# Patient Record
Sex: FEMALE | Race: WHITE | Employment: OTHER | URBAN - METROPOLITAN AREA
[De-identification: names, ages, dates, MRNs, and addresses within clinical notes are randomized per-mention and may not be internally consistent; named-entity substitution may affect disease eponyms.]

---

## 2017-02-22 ENCOUNTER — LAB REQUISITION (OUTPATIENT)
Dept: LAB | Facility: HOSPITAL | Age: 82
End: 2017-02-22
Attending: FAMILY MEDICINE
Payer: MEDICARE

## 2017-02-22 DIAGNOSIS — E78.5 HYPERLIPIDEMIA: ICD-10-CM

## 2017-02-22 DIAGNOSIS — I10 ESSENTIAL (PRIMARY) HYPERTENSION: ICD-10-CM

## 2017-02-22 LAB
ALBUMIN SERPL-MCNC: 3.6 G/DL (ref 3.5–4.8)
ALP LIVER SERPL-CCNC: 167 U/L (ref 55–142)
ALT SERPL-CCNC: 47 U/L (ref 14–54)
AST SERPL-CCNC: 31 U/L (ref 15–41)
BILIRUB SERPL-MCNC: 0.8 MG/DL (ref 0.1–2)
BUN BLD-MCNC: 21 MG/DL (ref 8–20)
CALCIUM BLD-MCNC: 9.7 MG/DL (ref 8.3–10.3)
CHLORIDE: 103 MMOL/L (ref 101–111)
CHOLEST SMN-MCNC: 245 MG/DL (ref ?–200)
CO2: 26 MMOL/L (ref 22–32)
CREAT BLD-MCNC: 1.01 MG/DL (ref 0.55–1.02)
GLUCOSE BLD-MCNC: 100 MG/DL (ref 70–99)
HDLC SERPL-MCNC: 60 MG/DL (ref 45–?)
HDLC SERPL: 4.08 {RATIO} (ref ?–4.44)
LDLC SERPL CALC-MCNC: 144 MG/DL (ref ?–130)
M PROTEIN MFR SERPL ELPH: 8.1 G/DL (ref 6.1–8.3)
NONHDLC SERPL-MCNC: 185 MG/DL (ref ?–130)
POTASSIUM SERPL-SCNC: 4.1 MMOL/L (ref 3.6–5.1)
SODIUM SERPL-SCNC: 137 MMOL/L (ref 136–144)
TRIGLYCERIDES: 207 MG/DL (ref ?–150)
VLDL: 41 MG/DL (ref 5–40)

## 2017-02-22 PROCEDURE — 80061 LIPID PANEL: CPT | Performed by: FAMILY MEDICINE

## 2017-02-22 PROCEDURE — 36415 COLL VENOUS BLD VENIPUNCTURE: CPT | Performed by: FAMILY MEDICINE

## 2017-02-22 PROCEDURE — 80053 COMPREHEN METABOLIC PANEL: CPT | Performed by: FAMILY MEDICINE

## 2017-12-06 ENCOUNTER — NURSE ONLY (OUTPATIENT)
Dept: LAB | Age: 82
End: 2017-12-06
Attending: FAMILY MEDICINE
Payer: MEDICARE

## 2017-12-06 DIAGNOSIS — E78.5 ELEVATED LIPIDS: ICD-10-CM

## 2017-12-06 DIAGNOSIS — I10 HTN (HYPERTENSION): Primary | ICD-10-CM

## 2017-12-06 DIAGNOSIS — F03.90 DEMENTIA (HCC): ICD-10-CM

## 2017-12-06 PROCEDURE — 80053 COMPREHEN METABOLIC PANEL: CPT

## 2017-12-06 PROCEDURE — 36415 COLL VENOUS BLD VENIPUNCTURE: CPT

## 2017-12-06 PROCEDURE — 80061 LIPID PANEL: CPT

## 2017-12-06 PROCEDURE — 85025 COMPLETE CBC W/AUTO DIFF WBC: CPT

## 2017-12-31 ENCOUNTER — APPOINTMENT (OUTPATIENT)
Dept: GENERAL RADIOLOGY | Facility: HOSPITAL | Age: 82
DRG: 291 | End: 2017-12-31
Attending: EMERGENCY MEDICINE
Payer: MEDICARE

## 2017-12-31 ENCOUNTER — HOSPITAL ENCOUNTER (INPATIENT)
Facility: HOSPITAL | Age: 82
LOS: 2 days | Discharge: HOME | DRG: 291 | End: 2018-01-02
Attending: EMERGENCY MEDICINE | Admitting: INTERNAL MEDICINE
Payer: MEDICARE

## 2017-12-31 ENCOUNTER — APPOINTMENT (OUTPATIENT)
Dept: ULTRASOUND IMAGING | Facility: HOSPITAL | Age: 82
DRG: 291 | End: 2017-12-31
Attending: EMERGENCY MEDICINE
Payer: MEDICARE

## 2017-12-31 DIAGNOSIS — I48.92 ATRIAL FIBRILLATION AND FLUTTER (HCC): ICD-10-CM

## 2017-12-31 DIAGNOSIS — I48.91 ATRIAL FIBRILLATION AND FLUTTER (HCC): ICD-10-CM

## 2017-12-31 DIAGNOSIS — R06.00 DYSPNEA ON EXERTION: Primary | ICD-10-CM

## 2017-12-31 DIAGNOSIS — I10 HYPERTENSION, UNSPECIFIED TYPE: ICD-10-CM

## 2017-12-31 DIAGNOSIS — I50.43 ACUTE ON CHRONIC COMBINED SYSTOLIC AND DIASTOLIC CHF (CONGESTIVE HEART FAILURE) (HCC): ICD-10-CM

## 2017-12-31 LAB
ALBUMIN SERPL-MCNC: 3.7 G/DL (ref 3.5–4.8)
ALP LIVER SERPL-CCNC: 135 U/L (ref 55–142)
ALT SERPL-CCNC: 49 U/L (ref 14–54)
APTT PPP: 32.7 SECONDS (ref 25–34)
AST SERPL-CCNC: 40 U/L (ref 15–41)
BASOPHILS # BLD AUTO: 0.03 X10(3) UL (ref 0–0.1)
BASOPHILS NFR BLD AUTO: 0.4 %
BILIRUB SERPL-MCNC: 0.7 MG/DL (ref 0.1–2)
BILIRUB UR QL STRIP.AUTO: NEGATIVE
BUN BLD-MCNC: 36 MG/DL (ref 8–20)
CALCIUM BLD-MCNC: 9.3 MG/DL (ref 8.3–10.3)
CHLORIDE: 104 MMOL/L (ref 101–111)
CLARITY UR REFRACT.AUTO: CLEAR
CO2: 31 MMOL/L (ref 22–32)
COLOR UR AUTO: YELLOW
CREAT BLD-MCNC: 1.28 MG/DL (ref 0.55–1.02)
D-DIMER: 0.47 UG/ML FEU (ref 0–0.49)
EOSINOPHIL # BLD AUTO: 0.12 X10(3) UL (ref 0–0.3)
EOSINOPHIL NFR BLD AUTO: 1.7 %
ERYTHROCYTE [DISTWIDTH] IN BLOOD BY AUTOMATED COUNT: 15 % (ref 11.5–16)
GLUCOSE BLD-MCNC: 120 MG/DL (ref 70–99)
GLUCOSE UR STRIP.AUTO-MCNC: NEGATIVE MG/DL
HCT VFR BLD AUTO: 39.1 % (ref 34–50)
HGB BLD-MCNC: 13 G/DL (ref 12–16)
IMMATURE GRANULOCYTE COUNT: 0.04 X10(3) UL (ref 0–1)
IMMATURE GRANULOCYTE RATIO %: 0.6 %
INR BLD: 1.11 (ref 0.89–1.11)
KETONES UR STRIP.AUTO-MCNC: NEGATIVE MG/DL
LEUKOCYTE ESTERASE UR QL STRIP.AUTO: NEGATIVE
LYMPHOCYTES # BLD AUTO: 1.41 X10(3) UL (ref 0.9–4)
LYMPHOCYTES NFR BLD AUTO: 20.3 %
M PROTEIN MFR SERPL ELPH: 7.9 G/DL (ref 6.1–8.3)
MCH RBC QN AUTO: 30.3 PG (ref 27–33.2)
MCHC RBC AUTO-ENTMCNC: 33.2 G/DL (ref 31–37)
MCV RBC AUTO: 91.1 FL (ref 81–100)
MONOCYTES # BLD AUTO: 0.78 X10(3) UL (ref 0.1–0.6)
MONOCYTES NFR BLD AUTO: 11.3 %
NEUTROPHIL ABS PRELIM: 4.55 X10 (3) UL (ref 1.3–6.7)
NEUTROPHILS # BLD AUTO: 4.55 X10(3) UL (ref 1.3–6.7)
NEUTROPHILS NFR BLD AUTO: 65.7 %
NITRITE UR QL STRIP.AUTO: NEGATIVE
PH UR STRIP.AUTO: 8 [PH] (ref 4.5–8)
PLATELET # BLD AUTO: 213 10(3)UL (ref 150–450)
POTASSIUM SERPL-SCNC: 4.3 MMOL/L (ref 3.6–5.1)
PRO-BETA NATRIURETIC PEPTIDE: 2615 PG/ML (ref ?–450)
PROT UR STRIP.AUTO-MCNC: NEGATIVE MG/DL
PSA SERPL DL<=0.01 NG/ML-MCNC: 14.4 SECONDS (ref 12–14.3)
RBC # BLD AUTO: 4.29 X10(6)UL (ref 3.8–5.1)
RBC UR QL AUTO: NEGATIVE
RED CELL DISTRIBUTION WIDTH-SD: 49.4 FL (ref 35.1–46.3)
SODIUM SERPL-SCNC: 141 MMOL/L (ref 136–144)
SP GR UR STRIP.AUTO: 1 (ref 1–1.03)
TROPONIN: <0.046 NG/ML (ref ?–0.05)
UROBILINOGEN UR STRIP.AUTO-MCNC: <2 MG/DL
WBC # BLD AUTO: 6.9 X10(3) UL (ref 4–13)

## 2017-12-31 PROCEDURE — 93970 EXTREMITY STUDY: CPT | Performed by: EMERGENCY MEDICINE

## 2017-12-31 PROCEDURE — 71010 XR CHEST AP PORTABLE  (CPT=71010): CPT | Performed by: EMERGENCY MEDICINE

## 2017-12-31 PROCEDURE — 99223 1ST HOSP IP/OBS HIGH 75: CPT | Performed by: INTERNAL MEDICINE

## 2017-12-31 RX ORDER — METOPROLOL SUCCINATE 50 MG/1
50 TABLET, EXTENDED RELEASE ORAL DAILY
Status: DISCONTINUED | OUTPATIENT
Start: 2017-12-31 | End: 2017-12-31

## 2017-12-31 RX ORDER — NITROGLYCERIN 20 MG/100ML
50 INJECTION INTRAVENOUS CONTINUOUS
Status: DISCONTINUED | OUTPATIENT
Start: 2017-12-31 | End: 2017-12-31

## 2017-12-31 RX ORDER — ASPIRIN 81 MG/1
81 TABLET, CHEWABLE ORAL DAILY
Status: DISCONTINUED | OUTPATIENT
Start: 2017-12-31 | End: 2018-01-02

## 2017-12-31 RX ORDER — ACETAMINOPHEN 325 MG/1
650 TABLET ORAL EVERY 6 HOURS PRN
Status: DISCONTINUED | OUTPATIENT
Start: 2017-12-31 | End: 2018-01-02

## 2017-12-31 RX ORDER — DONEPEZIL HYDROCHLORIDE 10 MG/1
10 TABLET, FILM COATED ORAL NIGHTLY
Status: DISCONTINUED | OUTPATIENT
Start: 2017-12-31 | End: 2018-01-02

## 2017-12-31 RX ORDER — MIRTAZAPINE 15 MG/1
30 TABLET, FILM COATED ORAL NIGHTLY
Status: DISCONTINUED | OUTPATIENT
Start: 2017-12-31 | End: 2018-01-02

## 2017-12-31 RX ORDER — FUROSEMIDE 10 MG/ML
40 INJECTION INTRAMUSCULAR; INTRAVENOUS ONCE
Status: COMPLETED | OUTPATIENT
Start: 2017-12-31 | End: 2017-12-31

## 2017-12-31 RX ORDER — ALLOPURINOL 100 MG/1
100 TABLET ORAL DAILY
Status: DISCONTINUED | OUTPATIENT
Start: 2017-12-31 | End: 2018-01-02

## 2017-12-31 RX ORDER — QUETIAPINE 25 MG/1
25 TABLET, FILM COATED ORAL NIGHTLY
Status: DISCONTINUED | OUTPATIENT
Start: 2017-12-31 | End: 2018-01-02

## 2017-12-31 RX ORDER — LABETALOL HYDROCHLORIDE 5 MG/ML
10 INJECTION, SOLUTION INTRAVENOUS EVERY 4 HOURS PRN
Status: DISCONTINUED | OUTPATIENT
Start: 2017-12-31 | End: 2018-01-02

## 2017-12-31 RX ORDER — DULOXETIN HYDROCHLORIDE 30 MG/1
60 CAPSULE, DELAYED RELEASE ORAL DAILY
Status: DISCONTINUED | OUTPATIENT
Start: 2017-12-31 | End: 2018-01-02

## 2017-12-31 RX ORDER — FUROSEMIDE 10 MG/ML
20 INJECTION INTRAMUSCULAR; INTRAVENOUS
Status: DISCONTINUED | OUTPATIENT
Start: 2017-12-31 | End: 2018-01-01

## 2017-12-31 RX ORDER — LOSARTAN POTASSIUM 100 MG/1
100 TABLET ORAL DAILY
Status: DISCONTINUED | OUTPATIENT
Start: 2017-12-31 | End: 2018-01-02

## 2017-12-31 RX ORDER — METOPROLOL SUCCINATE 25 MG/1
25 TABLET, EXTENDED RELEASE ORAL
Status: DISCONTINUED | OUTPATIENT
Start: 2018-01-01 | End: 2018-01-02

## 2017-12-31 RX ORDER — ONDANSETRON 2 MG/ML
4 INJECTION INTRAMUSCULAR; INTRAVENOUS EVERY 6 HOURS PRN
Status: DISCONTINUED | OUTPATIENT
Start: 2017-12-31 | End: 2018-01-02

## 2018-01-01 ENCOUNTER — APPOINTMENT (OUTPATIENT)
Dept: GENERAL RADIOLOGY | Facility: HOSPITAL | Age: 83
DRG: 291 | End: 2018-01-01
Attending: INTERNAL MEDICINE
Payer: MEDICARE

## 2018-01-01 LAB
BASOPHILS # BLD AUTO: 0.04 X10(3) UL (ref 0–0.1)
BASOPHILS NFR BLD AUTO: 0.6 %
BUN BLD-MCNC: 32 MG/DL (ref 8–20)
CALCIUM BLD-MCNC: 9.7 MG/DL (ref 8.3–10.3)
CHLORIDE: 104 MMOL/L (ref 101–111)
CO2: 31 MMOL/L (ref 22–32)
CREAT BLD-MCNC: 1.16 MG/DL (ref 0.55–1.02)
EOSINOPHIL # BLD AUTO: 0.15 X10(3) UL (ref 0–0.3)
EOSINOPHIL NFR BLD AUTO: 2.2 %
ERYTHROCYTE [DISTWIDTH] IN BLOOD BY AUTOMATED COUNT: 14.8 % (ref 11.5–16)
GLUCOSE BLD-MCNC: 103 MG/DL (ref 70–99)
HCT VFR BLD AUTO: 39.9 % (ref 34–50)
HGB BLD-MCNC: 13.1 G/DL (ref 12–16)
IMMATURE GRANULOCYTE COUNT: 0.02 X10(3) UL (ref 0–1)
IMMATURE GRANULOCYTE RATIO %: 0.3 %
LYMPHOCYTES # BLD AUTO: 1.57 X10(3) UL (ref 0.9–4)
LYMPHOCYTES NFR BLD AUTO: 23.5 %
MCH RBC QN AUTO: 30.2 PG (ref 27–33.2)
MCHC RBC AUTO-ENTMCNC: 32.8 G/DL (ref 31–37)
MCV RBC AUTO: 91.9 FL (ref 81–100)
MONOCYTES # BLD AUTO: 0.8 X10(3) UL (ref 0.1–0.6)
MONOCYTES NFR BLD AUTO: 12 %
NEUTROPHIL ABS PRELIM: 4.11 X10 (3) UL (ref 1.3–6.7)
NEUTROPHILS # BLD AUTO: 4.11 X10(3) UL (ref 1.3–6.7)
NEUTROPHILS NFR BLD AUTO: 61.4 %
PLATELET # BLD AUTO: 206 10(3)UL (ref 150–450)
POTASSIUM SERPL-SCNC: 3.8 MMOL/L (ref 3.6–5.1)
RBC # BLD AUTO: 4.34 X10(6)UL (ref 3.8–5.1)
RED CELL DISTRIBUTION WIDTH-SD: 49.1 FL (ref 35.1–46.3)
SODIUM SERPL-SCNC: 143 MMOL/L (ref 136–144)
WBC # BLD AUTO: 6.7 X10(3) UL (ref 4–13)

## 2018-01-01 PROCEDURE — 71046 X-RAY EXAM CHEST 2 VIEWS: CPT | Performed by: INTERNAL MEDICINE

## 2018-01-01 PROCEDURE — 99233 SBSQ HOSP IP/OBS HIGH 50: CPT | Performed by: HOSPITALIST

## 2018-01-01 RX ORDER — SENNOSIDES 8.6 MG
8.6 TABLET ORAL NIGHTLY
COMMUNITY

## 2018-01-01 RX ORDER — POTASSIUM CHLORIDE 20 MEQ/1
40 TABLET, EXTENDED RELEASE ORAL ONCE
Status: COMPLETED | OUTPATIENT
Start: 2018-01-01 | End: 2018-01-01

## 2018-01-01 RX ORDER — HYDRALAZINE HYDROCHLORIDE 25 MG/1
25 TABLET, FILM COATED ORAL 2 TIMES DAILY
Status: DISCONTINUED | OUTPATIENT
Start: 2018-01-01 | End: 2018-01-02

## 2018-01-01 RX ORDER — DOCUSATE SODIUM 100 MG/1
100 CAPSULE, LIQUID FILLED ORAL 2 TIMES DAILY
COMMUNITY

## 2018-01-01 RX ORDER — FUROSEMIDE 40 MG/1
40 TABLET ORAL DAILY
Status: DISCONTINUED | OUTPATIENT
Start: 2018-01-02 | End: 2018-01-02

## 2018-01-01 RX ORDER — FUROSEMIDE 10 MG/ML
20 INJECTION INTRAMUSCULAR; INTRAVENOUS
Status: COMPLETED | OUTPATIENT
Start: 2018-01-01 | End: 2018-01-01

## 2018-01-01 RX ORDER — SENNOSIDES 8.6 MG
8.6 TABLET ORAL 2 TIMES DAILY
Status: DISCONTINUED | OUTPATIENT
Start: 2018-01-01 | End: 2018-01-02

## 2018-01-01 NOTE — PROGRESS NOTES
12/31/17 2109 12/31/17 2112 12/31/17 2115   Vital Signs   Pulse 100 100 114   Heart Rate Source Monitor Monitor Monitor   Resp 20 --  --    Respiratory Quality Normal --  --    BP (!) 164/100 (!) 157/116 160/77   BP Location Right arm Right arm Right

## 2018-01-01 NOTE — PROGRESS NOTES
GREG HOSPITALIST  Progress Note     John Goncalves Patient Status:  Observation    1930 MRN [id number]   [facility] 2NE-A Attending Tio Encarnacion MD   Hosp Day # 0 PCP Heather Arevalo DO     Chief Complaint: dyspnea    S: Patient sta mg Oral Nightly   • Metoprolol Succinate ER  25 mg Oral 2x Daily(Beta Blocker)       ASSESSMENT / PLAN:     1. Acute CHF  1. Currently does not appear significantly fluid OL  2. Wean IV diuresis  3. Await records from OSH  2.  Hypertensive heart disease wit

## 2018-01-01 NOTE — H&P
GREG HOSPITALIST  History and Physical     Jyoti Evette Patient Status:  Emergency    1930 MRN [id number]   Location [facility] Attending Shahida Marcelino MD   Hosp Day # 0 PCP Mary Jane Atkins DO     Chief Complaint: STORY Disp: 30 tablet Rfl: 2   QUEtiapine Fumarate 25 MG Oral Tab TAKE 1 TABLET(25 MG) BY MOUTH EVERY NIGHT Disp: 30 tablet Rfl: 2   DULoxetine HCl (CYMBALTA) 60 MG Oral Cap DR Particles Take 1 capsule (60 mg total) by mouth daily.  Disp: 30 capsule Rfl: 2   Done edema.  Integument: No rashes or lesions. Psychiatric: Appropriate mood and affect.       Diagnostic Data:      Labs:  Recent Labs   Lab  12/31/17 1810   WBC  6.9   HGB  13.0   MCV  91.1   PLT  213.0   INR  1.11       Recent Labs   Lab  12/31/17   1810

## 2018-01-02 ENCOUNTER — APPOINTMENT (OUTPATIENT)
Dept: CV DIAGNOSTICS | Facility: HOSPITAL | Age: 83
DRG: 291 | End: 2018-01-02
Attending: INTERNAL MEDICINE
Payer: MEDICARE

## 2018-01-02 VITALS
TEMPERATURE: 98 F | DIASTOLIC BLOOD PRESSURE: 88 MMHG | RESPIRATION RATE: 20 BRPM | HEART RATE: 101 BPM | HEIGHT: 66 IN | OXYGEN SATURATION: 95 % | BODY MASS INDEX: 29.27 KG/M2 | SYSTOLIC BLOOD PRESSURE: 139 MMHG | WEIGHT: 182.13 LBS

## 2018-01-02 LAB
BUN BLD-MCNC: 32 MG/DL (ref 8–20)
CALCIUM BLD-MCNC: 9.9 MG/DL (ref 8.3–10.3)
CHLORIDE: 103 MMOL/L (ref 101–111)
CO2: 31 MMOL/L (ref 22–32)
CREAT BLD-MCNC: 1.35 MG/DL (ref 0.55–1.02)
GLUCOSE BLD-MCNC: 97 MG/DL (ref 70–99)
POTASSIUM SERPL-SCNC: 4.3 MMOL/L (ref 3.6–5.1)
POTASSIUM SERPL-SCNC: 4.3 MMOL/L (ref 3.6–5.1)
SODIUM SERPL-SCNC: 140 MMOL/L (ref 136–144)

## 2018-01-02 PROCEDURE — 93306 TTE W/DOPPLER COMPLETE: CPT | Performed by: INTERNAL MEDICINE

## 2018-01-02 PROCEDURE — 99239 HOSP IP/OBS DSCHRG MGMT >30: CPT | Performed by: HOSPITALIST

## 2018-01-02 RX ORDER — HYDRALAZINE HYDROCHLORIDE 25 MG/1
25 TABLET, FILM COATED ORAL 2 TIMES DAILY
Qty: 60 TABLET | Refills: 5 | Status: ON HOLD | OUTPATIENT
Start: 2018-01-02 | End: 2018-06-09

## 2018-01-02 RX ORDER — TORSEMIDE 20 MG/1
40 TABLET ORAL DAILY
Qty: 60 TABLET | Refills: 5 | Status: ON HOLD | OUTPATIENT
Start: 2018-01-03 | End: 2018-06-09

## 2018-01-02 RX ORDER — TORSEMIDE 20 MG/1
40 TABLET ORAL DAILY
Status: DISCONTINUED | OUTPATIENT
Start: 2018-01-02 | End: 2018-01-02

## 2018-01-02 NOTE — PROGRESS NOTES
GREG HOSPITALIST  Progress Note     Jyoti Alas Patient Status:  Observation    1930 MRN [id number]   [facility] 2NE-A Attending Jose Dumont MD   [facility] Day # 2 PCP Mary Jane Atkins DO     Chief Complaint: dyspnea    S: Patient has DULoxetine HCl  60 mg Oral Daily   • losartan  100 mg Oral Daily   • mirtazapine  30 mg Oral Nightly   • QUEtiapine Fumarate  25 mg Oral Nightly   • Metoprolol Succinate ER  25 mg Oral 2x Daily(Beta Blocker)       ASSESSMENT / PLAN:     1. Acute CHF  1.  Cu

## 2018-01-02 NOTE — CARDIAC REHAB
HF education initiated with patient. Patient forgetful. No family present. Needs reinforcement if possible.

## 2018-01-02 NOTE — PLAN OF CARE
NURSING DISCHARGE NOTE    Discharged Home via Wheelchair. Accompanied by Support staff  Belongings Taken by patient/family     D/c instructions given to pt. And son verbalized understanding.

## 2018-01-02 NOTE — CM/SW NOTE
01/02/18 1400   CM/SW Referral Data   Referral Source Other   Reason for Referral Protocol order set   Specify order set CHF   Pertinent Medical Hx   Primary Care Physician Name AUTUMN Kay DO   Patient Info   Patient's Mental Status Alert;Oriented   Pa

## 2018-01-02 NOTE — PLAN OF CARE
CARDIOVASCULAR - ADULT    • Maintains optimal cardiac output and hemodynamic stability Progressing    • Absence of cardiac arrhythmias or at baseline Progressing          Awake,alert, forgetful up in chair  Denies any chest pain, breathing better, on room

## 2018-01-02 NOTE — PROGRESS NOTES
[facility]  Cardiology Progress Note    Mateus Day Patient Status:  Observation    1930 MRN [id number]   [facility] 2NE-A Attending Daina Fulton MD   Hosp Day # 0 PCP Toma Hernandez DO     Subjective:She wants to go home tod consolidation. Normal vascularity. Stable scarring in the lung bases. CARDIAC:  Mild cardiomegaly unchanged. Tortuous thoracic aorta with extensive mural calcification. MEDIASTINUM:  Normal.  PLEURA:  Increase in the small right effusion.     BONES:  E was normal.     Systolic function was normal. The estimated ejection fraction was 60-65%.     No diagnostic evidence for regional wall motion abnormalities. The study     is not technically sufficient to allow evaluation of LV diastolic     function.   2. A

## 2018-01-02 NOTE — CONSULTS
[facility]  Report of Consultation    Fredis Hernandez Patient Status:  Observation    1930 MRN [id number]   [facility] 2NE-A Attending Judy Salcido MD   [facility] Day # 0 PCP Bianca Phan DO     Reason for Consultation:  CHF, dyspne tab 650 mg, 650 mg, Oral, Q6H PRN  •  ondansetron HCl (ZOFRAN) injection 4 mg, 4 mg, Intravenous, Q6H PRN  •  allopurinol (ZYLOPRIM) tab 100 mg, 100 mg, Oral, Daily  •  apixaban (ELIQUIS) tab 2.5 mg, 2.5 mg, Oral, BID  •  aspirin chewable tab 81 mg, 81 mg, PATIENT STATED HISTORY: (As transcribed by Technologist)  Patient offered no additional history at this time. FINDINGS:  LUNGS:  No focal consolidation. Normal vascularity. Stable scarring in the lung bases. CARDIAC:  Mild cardiomegaly unchanged.   Tor None.  INDICATIONS:  evan, sweating  PATIENT STATED HISTORY: (As transcribed by Technologist)  Patient states difficulty breathing. No history of any chest conditions. FINDINGS:  Patient is rotated.   Enlarged cardiomediastinal silhouette with atheroscler BP - better BP control may help to keep pt from CHF flare ups with the same oral diuretic dose  - echo with doppler to assess LV and RV function to help to adjust CV meds, most likely chronic changes  - continue Eliquis as initiated at [facility]

## 2018-01-02 NOTE — DIETARY NOTE
Nutrition Short Note    Dietitian consult received for heart failure education. Appropriate education and handout provided. All questions answered. RD available PRN.     Rodolfo Duarte MA, RD, LDN

## 2018-01-02 NOTE — PROGRESS NOTES
Assumed care of pt at 1900. Pt alert but forgetful,does not remember the date. On room air. Afib on tele. Up with SBA. Voids and continent. 2D echo ordered for the morning. Hydralazine started by cardiology. Will continue to monitor.

## 2018-01-03 LAB
ATRIAL RATE: 105 BPM
Q-T INTERVAL: 384 MS
QRS DURATION: 130 MS
QTC CALCULATION (BEZET): 495 MS
R AXIS: -82 DEGREES
T AXIS: 8 DEGREES
VENTRICULAR RATE: 100 BPM

## 2018-01-04 NOTE — DISCHARGE SUMMARY
[facility] HOSPITALIST  DISCHARGE SUMMARY     Amanda Piper Patient Status:  Inpatient    1930 MRN [id number]   [facility] 2NE-A Attending Rigoberto De Jesus, [facility] Day # 2 PCP Shawn Cheng DO     Date of Admission: 2017  Date of Devyn Root Synopsis:     The patient was diagnosed with acute congestive heart failure. She was seen by cardiology service and started IV diuretics. She had an echocardiogram done which showed preserved ejection fraction.   She does have chronic diastolic heart fail apixaban 5 MG Tabs  Commonly known as:  ELIQUIS      Take 2.5 mg by mouth 2 (two) times daily. Refills:  0     aspirin 81 MG Tabs      Take 81 mg by mouth daily.    Refills:  0     docusate sodium 100 MG Caps  Commonly known as:  COLACE      Take 100 mg nondistended      -----------------------------------------------------------------------------------------------  PATIENT DISCHARGE INSTRUCTIONS: See electronic chart    Jamir Hubbard MD 1/4/2018    Time spent:  > 30 minutes

## 2018-04-01 ENCOUNTER — HOSPITAL ENCOUNTER (EMERGENCY)
Facility: HOSPITAL | Age: 83
Discharge: HOME | End: 2018-04-01
Attending: EMERGENCY MEDICINE
Payer: MEDICARE

## 2018-04-01 ENCOUNTER — APPOINTMENT (OUTPATIENT)
Dept: GENERAL RADIOLOGY | Facility: HOSPITAL | Age: 83
End: 2018-04-01
Attending: EMERGENCY MEDICINE
Payer: MEDICARE

## 2018-04-01 ENCOUNTER — APPOINTMENT (OUTPATIENT)
Dept: CT IMAGING | Facility: HOSPITAL | Age: 83
End: 2018-04-01
Attending: EMERGENCY MEDICINE
Payer: MEDICARE

## 2018-04-01 VITALS
RESPIRATION RATE: 18 BRPM | DIASTOLIC BLOOD PRESSURE: 94 MMHG | HEIGHT: 67 IN | WEIGHT: 180 LBS | OXYGEN SATURATION: 98 % | TEMPERATURE: 98 F | HEART RATE: 92 BPM | SYSTOLIC BLOOD PRESSURE: 161 MMHG | BODY MASS INDEX: 28.25 KG/M2

## 2018-04-01 DIAGNOSIS — R55 SYNCOPE AND COLLAPSE: Primary | ICD-10-CM

## 2018-04-01 PROCEDURE — 99285 EMERGENCY DEPT VISIT HI MDM: CPT

## 2018-04-01 PROCEDURE — 73030 X-RAY EXAM OF SHOULDER: CPT | Performed by: EMERGENCY MEDICINE

## 2018-04-01 PROCEDURE — 70450 CT HEAD/BRAIN W/O DYE: CPT | Performed by: EMERGENCY MEDICINE

## 2018-04-01 PROCEDURE — 80053 COMPREHEN METABOLIC PANEL: CPT | Performed by: EMERGENCY MEDICINE

## 2018-04-01 PROCEDURE — 71046 X-RAY EXAM CHEST 2 VIEWS: CPT | Performed by: EMERGENCY MEDICINE

## 2018-04-01 PROCEDURE — 93010 ELECTROCARDIOGRAM REPORT: CPT

## 2018-04-01 PROCEDURE — 84484 ASSAY OF TROPONIN QUANT: CPT | Performed by: EMERGENCY MEDICINE

## 2018-04-01 PROCEDURE — 93005 ELECTROCARDIOGRAM TRACING: CPT

## 2018-04-01 PROCEDURE — 36415 COLL VENOUS BLD VENIPUNCTURE: CPT

## 2018-04-01 PROCEDURE — 82962 GLUCOSE BLOOD TEST: CPT

## 2018-04-01 PROCEDURE — 85025 COMPLETE CBC W/AUTO DIFF WBC: CPT | Performed by: EMERGENCY MEDICINE

## 2018-04-01 RX ORDER — FUROSEMIDE 40 MG/1
40 TABLET ORAL 2 TIMES DAILY
Status: ON HOLD | COMMUNITY
End: 2018-06-09

## 2018-04-01 NOTE — ED PROVIDER NOTES
Patient Seen in: [facility] Emergency Department    History   Patient presents with:  Fall (musculoskeletal, neurologic)    Stated Complaint: fall    HPI    61-year-old female, history of atrial fibrillation, on Eliquis, here after syncope.   Last ni Exam      Constitutional: Pt is oriented to person, place, and time. Appears well-developed and well-nourished. Head: Normocephalic and atraumatic. No tenderness of the facial bones.     Nose: Nose normal.   Eyes: EOM are normal. Pupils are equal, round Abnormality         Status                     ---------                               -----------         ------                     CBC W/ DIFFERENTIAL[[id number]]          Abnormal            Final result                 Please problems. Return precautions discussed in detail.     Disposition and Plan     Clinical Impression:  Syncope and collapse  (primary encounter diagnosis)    Disposition:  Discharge  4/1/2018 11:07 am    Follow-up:  Lisa Jackson [address] [place] [address]

## 2018-05-16 ENCOUNTER — NURSE ONLY (OUTPATIENT)
Dept: LAB | Age: 83
End: 2018-05-16
Attending: FAMILY MEDICINE
Payer: MEDICARE

## 2018-05-16 DIAGNOSIS — R32 URINE INCONTINENCE: Primary | ICD-10-CM

## 2018-05-16 PROCEDURE — 87086 URINE CULTURE/COLONY COUNT: CPT

## 2018-05-16 PROCEDURE — 81001 URINALYSIS AUTO W/SCOPE: CPT

## 2018-06-09 ENCOUNTER — APPOINTMENT (OUTPATIENT)
Dept: GENERAL RADIOLOGY | Facility: HOSPITAL | Age: 83
DRG: 291 | End: 2018-06-09
Payer: MEDICARE

## 2018-06-09 ENCOUNTER — HOSPITAL ENCOUNTER (INPATIENT)
Facility: HOSPITAL | Age: 83
LOS: 1 days | Discharge: HOME | DRG: 291 | End: 2018-06-12
Attending: EMERGENCY MEDICINE | Admitting: HOSPITALIST
Payer: MEDICARE

## 2018-06-09 ENCOUNTER — APPOINTMENT (OUTPATIENT)
Dept: ULTRASOUND IMAGING | Facility: HOSPITAL | Age: 83
DRG: 291 | End: 2018-06-09
Attending: HOSPITALIST
Payer: MEDICARE

## 2018-06-09 DIAGNOSIS — I50.9 CONGESTIVE HEART FAILURE, UNSPECIFIED HF CHRONICITY, UNSPECIFIED HEART FAILURE TYPE (HCC): Primary | ICD-10-CM

## 2018-06-09 PROCEDURE — 76700 US EXAM ABDOM COMPLETE: CPT | Performed by: HOSPITALIST

## 2018-06-09 PROCEDURE — 71045 X-RAY EXAM CHEST 1 VIEW: CPT

## 2018-06-09 PROCEDURE — 99223 1ST HOSP IP/OBS HIGH 75: CPT | Performed by: HOSPITALIST

## 2018-06-09 RX ORDER — POLYETHYLENE GLYCOL 3350 17 G/17G
17 POWDER, FOR SOLUTION ORAL DAILY PRN
Status: DISCONTINUED | OUTPATIENT
Start: 2018-06-09 | End: 2018-06-12

## 2018-06-09 RX ORDER — MAGNESIUM OXIDE 400 MG (241.3 MG MAGNESIUM) TABLET
100 TABLET DAILY
Status: ON HOLD | COMMUNITY
End: 2019-01-24

## 2018-06-09 RX ORDER — ALLOPURINOL 100 MG/1
100 TABLET ORAL DAILY
Status: DISCONTINUED | OUTPATIENT
Start: 2018-06-09 | End: 2018-06-12

## 2018-06-09 RX ORDER — ZOLPIDEM TARTRATE 10 MG/1
5 TABLET ORAL NIGHTLY PRN
Status: ON HOLD | COMMUNITY
End: 2018-06-12

## 2018-06-09 RX ORDER — DULOXETIN HYDROCHLORIDE 30 MG/1
60 CAPSULE, DELAYED RELEASE ORAL DAILY
Status: DISCONTINUED | OUTPATIENT
Start: 2018-06-09 | End: 2018-06-12

## 2018-06-09 RX ORDER — METOCLOPRAMIDE HYDROCHLORIDE 5 MG/ML
5 INJECTION INTRAMUSCULAR; INTRAVENOUS EVERY 8 HOURS PRN
Status: DISCONTINUED | OUTPATIENT
Start: 2018-06-09 | End: 2018-06-12

## 2018-06-09 RX ORDER — POLYETHYLENE GLYCOL 3350 17 G/17G
17 POWDER, FOR SOLUTION ORAL 2 TIMES DAILY
Status: DISCONTINUED | OUTPATIENT
Start: 2018-06-09 | End: 2018-06-12

## 2018-06-09 RX ORDER — QUETIAPINE 25 MG/1
25 TABLET, FILM COATED ORAL NIGHTLY
Status: DISCONTINUED | OUTPATIENT
Start: 2018-06-09 | End: 2018-06-12

## 2018-06-09 RX ORDER — METOPROLOL SUCCINATE 25 MG/1
25 TABLET, EXTENDED RELEASE ORAL
Status: DISCONTINUED | OUTPATIENT
Start: 2018-06-10 | End: 2018-06-10

## 2018-06-09 RX ORDER — DONEPEZIL HYDROCHLORIDE 10 MG/1
10 TABLET, FILM COATED ORAL NIGHTLY
Status: DISCONTINUED | OUTPATIENT
Start: 2018-06-09 | End: 2018-06-12

## 2018-06-09 RX ORDER — BISACODYL 10 MG
10 SUPPOSITORY, RECTAL RECTAL
Status: DISCONTINUED | OUTPATIENT
Start: 2018-06-09 | End: 2018-06-12

## 2018-06-09 RX ORDER — AMLODIPINE BESYLATE 5 MG/1
5 TABLET ORAL DAILY
Status: ON HOLD | COMMUNITY
End: 2018-06-09

## 2018-06-09 RX ORDER — ONDANSETRON 2 MG/ML
4 INJECTION INTRAMUSCULAR; INTRAVENOUS EVERY 6 HOURS PRN
Status: DISCONTINUED | OUTPATIENT
Start: 2018-06-09 | End: 2018-06-12

## 2018-06-09 RX ORDER — HYDRALAZINE HYDROCHLORIDE 50 MG/1
50 TABLET, FILM COATED ORAL 3 TIMES DAILY
COMMUNITY

## 2018-06-09 RX ORDER — FUROSEMIDE 10 MG/ML
40 INJECTION INTRAMUSCULAR; INTRAVENOUS ONCE
Status: COMPLETED | OUTPATIENT
Start: 2018-06-09 | End: 2018-06-09

## 2018-06-09 RX ORDER — HEPARIN SODIUM 5000 [USP'U]/ML
5000 INJECTION, SOLUTION INTRAVENOUS; SUBCUTANEOUS EVERY 8 HOURS SCHEDULED
Status: DISCONTINUED | OUTPATIENT
Start: 2018-06-09 | End: 2018-06-09

## 2018-06-09 RX ORDER — ACETAMINOPHEN 325 MG/1
650 TABLET ORAL EVERY 6 HOURS PRN
Status: DISCONTINUED | OUTPATIENT
Start: 2018-06-09 | End: 2018-06-12

## 2018-06-09 RX ORDER — HYDRALAZINE HYDROCHLORIDE 25 MG/1
25 TABLET, FILM COATED ORAL 2 TIMES DAILY
Status: DISCONTINUED | OUTPATIENT
Start: 2018-06-09 | End: 2018-06-12

## 2018-06-09 RX ORDER — AMLODIPINE BESYLATE 5 MG/1
5 TABLET ORAL DAILY
Status: DISCONTINUED | OUTPATIENT
Start: 2018-06-09 | End: 2018-06-12

## 2018-06-09 RX ORDER — ARIPIPRAZOLE 2 MG/1
1 TABLET ORAL DAILY
Status: DISCONTINUED | OUTPATIENT
Start: 2018-06-09 | End: 2018-06-12

## 2018-06-09 RX ORDER — ONDANSETRON 2 MG/ML
4 INJECTION INTRAMUSCULAR; INTRAVENOUS EVERY 4 HOURS PRN
Status: DISCONTINUED | OUTPATIENT
Start: 2018-06-09 | End: 2018-06-09

## 2018-06-09 RX ORDER — ASPIRIN 81 MG/1
81 TABLET ORAL DAILY
Status: DISCONTINUED | OUTPATIENT
Start: 2018-06-09 | End: 2018-06-12

## 2018-06-09 RX ORDER — MIRTAZAPINE 15 MG/1
30 TABLET, FILM COATED ORAL NIGHTLY
Status: DISCONTINUED | OUTPATIENT
Start: 2018-06-09 | End: 2018-06-12

## 2018-06-09 RX ORDER — CHLORAL HYDRATE 500 MG
1000 CAPSULE ORAL DAILY
COMMUNITY

## 2018-06-09 RX ORDER — ATORVASTATIN CALCIUM 10 MG/1
10 TABLET, FILM COATED ORAL NIGHTLY
Status: DISCONTINUED | OUTPATIENT
Start: 2018-06-09 | End: 2018-06-12

## 2018-06-09 RX ORDER — ATORVASTATIN CALCIUM 10 MG/1
10 TABLET, FILM COATED ORAL NIGHTLY
Status: ON HOLD | COMMUNITY
End: 2018-06-09

## 2018-06-09 NOTE — H&P
[facility] HOSPITALIST  History and Physical     Raciel Sotomayor Patient Status:  Emergency    1930 MRN [id number]   Location [address] Attending Phylliss Bumpers, MD   Hosp Day # 0 PCP Juwan aBll DO     Chief Complaint: dy DR Particles Take 1 capsule (60 mg total) by mouth daily. Disp: 30 capsule Rfl: 2   Losartan Potassium 100 MG Oral Tab Take 100 mg by mouth daily. Disp:  Rfl:    Metoprolol Succinate ER 50 MG Oral Tablet 24 Hr Take 25 mg by mouth 2 (two) times daily.    Dis intact. Musculoskeletal: Moves all extremities. Extremities: mild edema, chronic stasis changes   Integument: No rashes or lesions. Psychiatric: Appropriate mood and affect.       Diagnostic Data:      Labs:  Recent Labs   Lab  06/09/18   1457   WBC  11

## 2018-06-09 NOTE — ED NOTES
p going to room 2619, reported to [employer]. Transport paged pt aware of her admission and verbalizing understanding. Pt's son Aki Cabrales en route to the hospital and aware of pt's room assignment.

## 2018-06-09 NOTE — ED PROVIDER NOTES
Patient Seen in: [facility] Emergency Department    History   Patient presents with:  Dyspnea WARNER SOB (respiratory)    Stated Complaint: WARNER    HPI    Patient is a pleasant 80-year-old female, with multiple past medical history, presenting for evalu alert, sitting upright on the cart, in no apparent distress. HEENT: Head is without evidence of trauma. Extraocular muscles are intact. Pupils are equal and reactive to light. Oropharynx is pink and moist.  NECK: Neck is supple and nontender.  The trachea ---------                               -----------         ------                     CBC W/ DIFFERENTIAL[[id number]]          Abnormal            Final result                 Please view results for these tests on the individual orders.    RAINBOW DRAW B laboratory and radiology studies were reviewed and discussed with the patient. IV Lasix administered. Patient will be admitted to facilitate further evaluation and treatment. Patient will be admitted to the service of the [facility].   Dr. Jessee Davis

## 2018-06-10 ENCOUNTER — PRIOR ORIGINAL RECORDS (OUTPATIENT)
Dept: OTHER | Age: 83
End: 2018-06-10

## 2018-06-10 PROCEDURE — 99232 SBSQ HOSP IP/OBS MODERATE 35: CPT | Performed by: HOSPITALIST

## 2018-06-10 RX ORDER — DULOXETIN HYDROCHLORIDE 30 MG/1
90 CAPSULE, DELAYED RELEASE ORAL DAILY
Status: ON HOLD | COMMUNITY
End: 2019-01-24

## 2018-06-10 RX ORDER — FUROSEMIDE 10 MG/ML
40 INJECTION INTRAMUSCULAR; INTRAVENOUS ONCE
Status: COMPLETED | OUTPATIENT
Start: 2018-06-10 | End: 2018-06-10

## 2018-06-10 RX ORDER — METOPROLOL SUCCINATE 50 MG/1
50 TABLET, EXTENDED RELEASE ORAL
Status: DISCONTINUED | OUTPATIENT
Start: 2018-06-11 | End: 2018-06-12

## 2018-06-10 RX ORDER — METOPROLOL SUCCINATE 25 MG/1
25 TABLET, EXTENDED RELEASE ORAL ONCE
Status: COMPLETED | OUTPATIENT
Start: 2018-06-10 | End: 2018-06-10

## 2018-06-10 NOTE — HISTORICAL OFFICE NOTE
Tenzin Gill  [id number]  : 1930  ACCOUNT: [id number]  PCP: Dr. Pressley Simmonds: [facility]: Alina Rhodes, [address] [place] [address],[address]: Snow Almaraz  Admitted: 2017  Discharged: 2018 venous Doppler in the ER. The plan was to give her two doses of IV Lasix, switch her back to oral diuretics in the morning, check an echo, continue her Eliquis and have her follow up with her cardiologist at [facility] after discharge.       Her echo

## 2018-06-10 NOTE — PLAN OF CARE
CARDIOVASCULAR - ADULT    • Maintains optimal cardiac output and hemodynamic stability Progressing    • Absence of cardiac arrhythmias or at baseline Progressing        Cardiac monitor shows. Dalia Band Dalia Band Dalia Band Afib HR at 90's to low 100's.    With SOB with exertion , uses

## 2018-06-10 NOTE — PLAN OF CARE
Pt. Received from ER to room 2619. She is dyspneic with any exertion. . She is in afib on monitor. Lower extremities with 3 plus edema noted. Pt. Has no c/o pain.

## 2018-06-10 NOTE — PLAN OF CARE
Problem: CARDIOVASCULAR - ADULT  Goal: Maintains optimal cardiac output and hemodynamic stability  INTERVENTIONS:  - Monitor vital signs, rhythm, and trends  - Monitor for bleeding, hypotension and signs of decreased cardiac output  - Evaluate effectivenes showing a-fib/ a-fib RVR; lung sounds diminished, weaned to 1 liter of oxygen via nasal cannula (patient does not wear oxygen at home), no cough noted; edema to BLE; mostly continent of urine, wears a pad, and continent of bowel , last bowel movement this

## 2018-06-11 PROCEDURE — 99232 SBSQ HOSP IP/OBS MODERATE 35: CPT | Performed by: HOSPITALIST

## 2018-06-11 RX ORDER — FUROSEMIDE 10 MG/ML
40 INJECTION INTRAMUSCULAR; INTRAVENOUS
Status: DISCONTINUED | OUTPATIENT
Start: 2018-06-11 | End: 2018-06-12

## 2018-06-11 NOTE — DIETARY NOTE
Nutrition Short Note    Dietitian consult received for heart failure education. Appropriate education and handout provided. Pt is from Assisted Living where meals are prepared for her. Discussed avoiding salt shaker & fluid restriction.   Discussed daily s

## 2018-06-11 NOTE — PROGRESS NOTES
06/11/18 0837   Clinical Encounter Type   Referral To ( made referral to [facility] for [employer] as requested.   to remain available at pager [phone].)   Jain Encounters   Spiritual Requests During Visit / [place] [employer]

## 2018-06-11 NOTE — CM/SW NOTE
06/11/18 1500   CM/SW Referral Data   Referral Source Physician   Reason for Referral Psychoscial assessment   Informant Patient   Social History   Recreational Drug/Alcohol Use no   Major Changes Last 6 Months no   Domestic/Partner Violence no   Suicid

## 2018-06-11 NOTE — CONSULTS
[facility]  Report of Consultation    [place] Shay Patient Status:  Observation    1930 MRN [id number]   [facility] 2NE-A Attending Adam Gaffney MD   Hosp Day # 0 PCP Toma Hernandez DO     Reason for Consultation:  Shortness Donepezil HCl (ARICEPT) tab 10 mg, 10 mg, Oral, Nightly  •  DULoxetine HCl (CYMBALTA) DR particles cap 60 mg, 60 mg, Oral, Daily  •  hydrALAzine HCl (APRESOLINE) tab 25 mg, 25 mg, Oral, BID  •  mirtazapine (REMERON) tab 30 mg, 30 mg, Oral, Nightly  •  QUEt Assessment/Plan:  Patient Active Problem List:     Dyspnea on exertion     Hypertension     Atrial fibrillation and flutter (HCC)     Hypertensive urgency     Acute on chronic combined systolic and diastolic CHF (congestive heart failure) ([facility] [address]     C

## 2018-06-11 NOTE — PROGRESS NOTES
GREG HOSPITALIST  Progress Note     Yousif Carolina Patient Status:  Observation    1930 MRN [id number]   [facility] 2NE-A Attending Karena Jay MD   Hosp Day # 0 PCP Henrietta Orozco DO     Chief Complaint: Dyspnea     S: Looks wel Daily   • AmLODIPine Besylate  5 mg Oral Daily   • apixaban  2.5 mg Oral BID   • ARIPiprazole  1 mg Oral Daily   • aspirin  81 mg Oral Daily   • atorvastatin  10 mg Oral Nightly   • Donepezil HCl  10 mg Oral Nightly   • DULoxetine HCl  60 mg Oral Daily   •

## 2018-06-11 NOTE — SIGNIFICANT EVENT
At [id number], patient experienced 9 beats of v-tach, patient was asymptomatic when this occurred. Notified Rehan TABARES in cardiology and receive order for lopressor 25 mg once. Orders carried out. Will continue to monitor the patient closely.

## 2018-06-12 ENCOUNTER — PRIOR ORIGINAL RECORDS (OUTPATIENT)
Dept: OTHER | Age: 83
End: 2018-06-12

## 2018-06-12 VITALS
WEIGHT: 183 LBS | HEIGHT: 67 IN | HEART RATE: 87 BPM | TEMPERATURE: 98 F | BODY MASS INDEX: 28.72 KG/M2 | DIASTOLIC BLOOD PRESSURE: 90 MMHG | SYSTOLIC BLOOD PRESSURE: 133 MMHG | RESPIRATION RATE: 18 BRPM | OXYGEN SATURATION: 92 %

## 2018-06-12 PROCEDURE — 99239 HOSP IP/OBS DSCHRG MGMT >30: CPT | Performed by: HOSPITALIST

## 2018-06-12 RX ORDER — FUROSEMIDE 20 MG/1
20 TABLET ORAL
Status: DISCONTINUED | OUTPATIENT
Start: 2018-06-12 | End: 2018-06-12

## 2018-06-12 RX ORDER — FUROSEMIDE 20 MG/1
20 TABLET ORAL
Qty: 60 TABLET | Refills: 5 | Status: ON HOLD | OUTPATIENT
Start: 2018-06-12 | End: 2019-01-24

## 2018-06-12 RX ORDER — METOPROLOL SUCCINATE 100 MG/1
100 TABLET, EXTENDED RELEASE ORAL
Status: DISCONTINUED | OUTPATIENT
Start: 2018-06-13 | End: 2018-06-12

## 2018-06-12 RX ORDER — ATORVASTATIN CALCIUM 10 MG/1
10 TABLET, FILM COATED ORAL NIGHTLY
Qty: 30 TABLET | Refills: 5 | Status: SHIPPED | OUTPATIENT
Start: 2018-06-12

## 2018-06-12 RX ORDER — METOPROLOL SUCCINATE 100 MG/1
100 TABLET, EXTENDED RELEASE ORAL
Qty: 30 TABLET | Refills: 5 | Status: SHIPPED | OUTPATIENT
Start: 2018-06-13

## 2018-06-12 RX ORDER — POTASSIUM CHLORIDE 20 MEQ/1
40 TABLET, EXTENDED RELEASE ORAL ONCE
Status: COMPLETED | OUTPATIENT
Start: 2018-06-12 | End: 2018-06-12

## 2018-06-12 RX ORDER — ARIPIPRAZOLE 2 MG/1
1 TABLET ORAL DAILY
Qty: 30 TABLET | Refills: 0 | Status: ON HOLD | OUTPATIENT
Start: 2018-06-13 | End: 2019-01-24

## 2018-06-12 RX ORDER — METOPROLOL SUCCINATE 50 MG/1
50 TABLET, EXTENDED RELEASE ORAL ONCE
Status: COMPLETED | OUTPATIENT
Start: 2018-06-12 | End: 2018-06-12

## 2018-06-12 RX ORDER — QUETIAPINE 25 MG/1
25 TABLET, FILM COATED ORAL NIGHTLY
Qty: 30 TABLET | Refills: 0 | Status: ON HOLD | OUTPATIENT
Start: 2018-06-12 | End: 2019-01-24

## 2018-06-12 NOTE — DISCHARGE PLANNING
NURSING DISCHARGE NOTE    Discharged Home via Wheelchair. Accompanied by Family member and Support staff  Belongings Taken by patient/family.     Patient and son given discharge instructions at the bedside with the opportunity to ask questions as neede

## 2018-06-12 NOTE — PROGRESS NOTES
GREG HOSPITALIST  Progress Note     Major Wan Patient Status:  Observation    1930 MRN [id number]   [facility] 2NE-A Attending Edil Lincoln MD   Hosp Day # 1 PCP Gene Coello DO     Chief Complaint: Dyspnea     S: A bit mor metoprolol succinate  100 mg Oral Daily Beta Blocker   • furosemide  20 mg Oral BID (Diuretic)   • PEG 3350  17 g Oral BID   • allopurinol  100 mg Oral Daily   • AmLODIPine Besylate  5 mg Oral Daily   • apixaban  2.5 mg Oral BID   • ARIPiprazole  1 mg Oral

## 2018-06-12 NOTE — PROGRESS NOTES
[facility]  Progress Note    Jade Falcon Patient Status:  Inpatient    1930 MRN [id number]   [facility] 2NE-A Attending Esther Lares MD   [address] Day # 1 PCP Mely Velasquez DO       Assessment:    · Shortness of breath  · Afib • allopurinol  100 mg Oral Daily   • AmLODIPine Besylate  5 mg Oral Daily   • apixaban  2.5 mg Oral BID   • ARIPiprazole  1 mg Oral Daily   • aspirin  81 mg Oral Daily   • atorvastatin  10 mg Oral Nightly   • Donepezil HCl  10 mg Oral Nightly   • DULoxet

## 2018-06-12 NOTE — DISCHARGE SUMMARY
GREG HOSPITALIST  DISCHARGE SUMMARY     John Goncalves Patient Status:  Inpatient    1930 MRN [id number]   [facility] 2NE-A Attending Robert Mireles MD   Hosp Day # 1 PCP Heather Arevalo DO     Date of Admission: 2018  Date of Sina Gardiner have patient see her psychiatrist as there was some concern of depression during stay.      Procedures during hospitalization:   • none    Incidental or significant findings and recommendations (brief descriptions):   Metoprolol increased due to NSVT     La docusate sodium 100 MG Caps  Commonly known as:  COLACE      Take 100 mg by mouth 2 (two) times daily. Refills:  0     DULoxetine HCl 30 MG Cpep  Commonly known as:  CYMBALTA      Take 90 mg by mouth daily.    Refills:  0     hydrALAzine HCl 50 MG Tabs °C)  Pulse:  [81-97] 87  Resp:  [17-18] 18  BP: (131-148)/(68-90) 133/90    Physical Exam:    General: No acute distress. Respiratory: Clear to auscultation bilaterally. No wheezes. No rhonchi. Cardiovascular: S1, S2. Regular rate and rhythm.  No murmurs

## 2018-06-13 NOTE — CM/SW NOTE
06/13/18 1000   Discharge disposition   Expected discharge disposition Home or Self   Name of Facillity[facility] Care[facility]   Discharge transportation Private car

## 2018-06-19 ENCOUNTER — PRIOR ORIGINAL RECORDS (OUTPATIENT)
Dept: OTHER | Age: 83
End: 2018-06-19

## 2018-06-20 LAB
ALBUMIN: 3 G/DL
ALKALINE PHOSPHATATE(ALK PHOS): 144 IU/L
BILIRUBIN TOTAL: 0.9 MG/DL
BUN: 18 MG/DL
BUN: 32 MG/DL
CALCIUM: 9.2 MG/DL
CALCIUM: 9.2 MG/DL
CHLORIDE: 101 MEQ/L
CHLORIDE: 102 MEQ/L
CREATININE, SERUM: 1.2 MG/DL
CREATININE, SERUM: 1.24 MG/DL
GLUCOSE: 115 MG/DL
GLUCOSE: 95 MG/DL
HEMATOCRIT: 41.8 %
HEMOGLOBIN: 13.3 G/DL
MAGNESIUM: 2.4 MG/DL
PLATELETS: 221 K/UL
POTASSIUM, SERUM: 3.7 MEQ/L
POTASSIUM, SERUM: 3.9 MEQ/L
PROBNP: 4454 PG/ML
PROTEIN, TOTAL: 7.3 G/DL
RED BLOOD COUNT: 4.49 X 10-6/U
SGOT (AST): 25 IU/L
SGPT (ALT): 51 IU/L
SODIUM: 141 MEQ/L
SODIUM: 141 MEQ/L
WHITE BLOOD COUNT: 11.5 X 10-3/U

## 2018-07-03 ENCOUNTER — PRIOR ORIGINAL RECORDS (OUTPATIENT)
Dept: OTHER | Age: 83
End: 2018-07-03

## 2018-07-03 ENCOUNTER — NURSE ONLY (OUTPATIENT)
Dept: LAB | Age: 83
End: 2018-07-03
Attending: INTERNAL MEDICINE
Payer: MEDICARE

## 2018-07-03 DIAGNOSIS — I50.33 ACUTE ON CHRONIC DIASTOLIC HEART FAILURE (HCC): ICD-10-CM

## 2018-07-03 DIAGNOSIS — I10 ESSENTIAL HYPERTENSION, MALIGNANT: Primary | ICD-10-CM

## 2018-07-03 LAB
BUN BLD-MCNC: 15 MG/DL (ref 8–20)
CALCIUM BLD-MCNC: 9.8 MG/DL (ref 8.3–10.3)
CHLORIDE: 105 MMOL/L (ref 101–111)
CO2: 24 MMOL/L (ref 22–32)
CREAT BLD-MCNC: 1.2 MG/DL (ref 0.55–1.02)
GLUCOSE BLD-MCNC: 115 MG/DL (ref 70–99)
POTASSIUM SERPL-SCNC: 3.9 MMOL/L (ref 3.6–5.1)
SODIUM SERPL-SCNC: 142 MMOL/L (ref 136–144)

## 2018-07-03 PROCEDURE — 36415 COLL VENOUS BLD VENIPUNCTURE: CPT

## 2018-07-03 PROCEDURE — 80048 BASIC METABOLIC PNL TOTAL CA: CPT

## 2018-07-10 ENCOUNTER — MYAURORA ACCOUNT LINK (OUTPATIENT)
Dept: OTHER | Age: 83
End: 2018-07-10

## 2018-07-10 ENCOUNTER — PRIOR ORIGINAL RECORDS (OUTPATIENT)
Dept: OTHER | Age: 83
End: 2018-07-10

## 2018-07-10 LAB
BUN: 15 MG/DL
CALCIUM: 9.8 MG/DL
CHLORIDE: 105 MEQ/L
CREATININE, SERUM: 1.2 MG/DL
GLUCOSE: 115 MG/DL
POTASSIUM, SERUM: 3.9 MEQ/L
SODIUM: 142 MEQ/L

## 2018-07-27 ENCOUNTER — PRIOR ORIGINAL RECORDS (OUTPATIENT)
Dept: OTHER | Age: 83
End: 2018-07-27

## 2018-07-27 ENCOUNTER — MYAURORA ACCOUNT LINK (OUTPATIENT)
Dept: OTHER | Age: 83
End: 2018-07-27

## 2018-08-07 ENCOUNTER — PRIOR ORIGINAL RECORDS (OUTPATIENT)
Dept: OTHER | Age: 83
End: 2018-08-07

## 2018-09-18 ENCOUNTER — MYAURORA ACCOUNT LINK (OUTPATIENT)
Dept: OTHER | Age: 83
End: 2018-09-18

## 2018-09-18 ENCOUNTER — PRIOR ORIGINAL RECORDS (OUTPATIENT)
Dept: OTHER | Age: 83
End: 2018-09-18

## 2018-10-01 PROCEDURE — 87086 URINE CULTURE/COLONY COUNT: CPT

## 2018-10-01 PROCEDURE — 81001 URINALYSIS AUTO W/SCOPE: CPT

## 2018-10-02 ENCOUNTER — LAB REQUISITION (OUTPATIENT)
Dept: LAB | Facility: HOSPITAL | Age: 83
End: 2018-10-02
Payer: MEDICARE

## 2018-10-02 DIAGNOSIS — R32 URINARY INCONTINENCE: ICD-10-CM

## 2018-12-11 ENCOUNTER — PRIOR ORIGINAL RECORDS (OUTPATIENT)
Dept: OTHER | Age: 83
End: 2018-12-11

## 2019-01-16 ENCOUNTER — APPOINTMENT (OUTPATIENT)
Dept: GENERAL RADIOLOGY | Facility: HOSPITAL | Age: 84
End: 2019-01-16
Attending: PHYSICIAN ASSISTANT
Payer: MEDICARE

## 2019-01-16 ENCOUNTER — HOSPITAL ENCOUNTER (EMERGENCY)
Facility: HOSPITAL | Age: 84
Discharge: HOME | End: 2019-01-16
Attending: EMERGENCY MEDICINE
Payer: MEDICARE

## 2019-01-16 ENCOUNTER — APPOINTMENT (OUTPATIENT)
Dept: CT IMAGING | Facility: HOSPITAL | Age: 84
End: 2019-01-16
Attending: PHYSICIAN ASSISTANT
Payer: MEDICARE

## 2019-01-16 VITALS
RESPIRATION RATE: 18 BRPM | OXYGEN SATURATION: 96 % | SYSTOLIC BLOOD PRESSURE: 140 MMHG | BODY MASS INDEX: 29.03 KG/M2 | HEART RATE: 68 BPM | HEIGHT: 67 IN | TEMPERATURE: 96 F | WEIGHT: 185 LBS | DIASTOLIC BLOOD PRESSURE: 91 MMHG
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
## 2019-01-16 DIAGNOSIS — S20.211A CONTUSION OF RIB ON RIGHT SIDE, INITIAL ENCOUNTER: Primary | ICD-10-CM

## 2019-01-16 PROCEDURE — 71101 X-RAY EXAM UNILAT RIBS/CHEST: CPT | Performed by: PHYSICIAN ASSISTANT

## 2019-01-16 PROCEDURE — 99284 EMERGENCY DEPT VISIT MOD MDM: CPT

## 2019-01-16 PROCEDURE — 70450 CT HEAD/BRAIN W/O DYE: CPT | Performed by: PHYSICIAN ASSISTANT

## 2019-01-16 NOTE — ED PROVIDER NOTES
I reviewed that chart and discussed the case. I have examined the patient and noted lungs are clear to auscultation bilaterally cardiovascular exam shows regular rhythm abdomen soft nontender.       I agree with the following clinical impression(s):  No di

## 2019-01-17 NOTE — ED PROVIDER NOTES
Patient Seen in: [facility] Emergency Department    History   Patient presents with:  Fall (musculoskeletal, neurologic)  Contusion (musculoskeletal)  Anticoagulation    Stated Complaint: Fall with pain in Rt ribs, takes Eliquis    HPI    Chance Pina is a noted above.     Physical Exam     ED Triage Vitals [01/16/19 1714]   /88   Pulse 76   Resp 20   Temp (!) 96.1 °F (35.6 °C)   Temp src Temporal   SpO2 96 %   O2 Device None (Room air)       Current:BP (!) 140/91   Pulse 68   Temp (!) 96.1 °F (35.6 °C) moderate generalized atrophy. No new mass effect or midline shift. White matter low attenuation is consistent with chronic small vessel disease, considered mild to moderate and similar to the prior. No acute intracranial hemorrhage.   The visualized para the plan of care with the patient, who expresses understanding. All questions and concerns are addressed to the patient's satisfaction prior to discharge today.   Disposition and Plan     Clinical Impression:  Contusion of rib on right side, initial encoun

## 2019-01-24 ENCOUNTER — APPOINTMENT (OUTPATIENT)
Dept: GENERAL RADIOLOGY | Facility: HOSPITAL | Age: 84
DRG: 515 | End: 2019-01-24
Attending: EMERGENCY MEDICINE
Payer: MEDICARE

## 2019-01-24 ENCOUNTER — APPOINTMENT (OUTPATIENT)
Dept: CT IMAGING | Facility: HOSPITAL | Age: 84
DRG: 515 | End: 2019-01-24
Attending: EMERGENCY MEDICINE
Payer: MEDICARE

## 2019-01-24 ENCOUNTER — HOSPITAL ENCOUNTER (INPATIENT)
Facility: HOSPITAL | Age: 84
LOS: 7 days | Discharge: NURSING, REHAB OR LONG-TERM CARE | DRG: 515 | End: 2019-02-02
Attending: EMERGENCY MEDICINE | Admitting: HOSPITALIST
Payer: MEDICARE

## 2019-01-24 DIAGNOSIS — M48.50XA SPINAL COMPRESSION FRACTURE (HCC): ICD-10-CM
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
## 2019-01-24 PROBLEM — A41.9 SEPSIS (HCC): Status: ACTIVE | Noted: 2019-01-24

## 2019-01-24 LAB
ALBUMIN SERPL-MCNC: 3.4 G/DL (ref 3.1–4.5)
ALBUMIN/GLOB SERPL: 0.9 {RATIO} (ref 1–2)
ALP LIVER SERPL-CCNC: 120 U/L (ref 55–142)
ALT SERPL-CCNC: 26 U/L (ref 14–54)
ANION GAP SERPL CALC-SCNC: 5 MMOL/L (ref 0–18)
AST SERPL-CCNC: 35 U/L (ref 15–41)
BASOPHILS # BLD AUTO: 0.04 X10(3) UL (ref 0–0.1)
BASOPHILS NFR BLD AUTO: 0.3 %
BILIRUB SERPL-MCNC: 0.6 MG/DL (ref 0.1–2)
BUN BLD-MCNC: 18 MG/DL (ref 8–20)
BUN/CREAT SERPL: 14.4 (ref 10–20)
CALCIUM BLD-MCNC: 9 MG/DL (ref 8.3–10.3)
CHLORIDE SERPL-SCNC: 105 MMOL/L (ref 101–111)
CO2 SERPL-SCNC: 29 MMOL/L (ref 22–32)
CREAT BLD-MCNC: 1.25 MG/DL (ref 0.55–1.02)
EOSINOPHIL # BLD AUTO: 0.08 X10(3) UL (ref 0–0.3)
EOSINOPHIL NFR BLD AUTO: 0.7 %
ERYTHROCYTE [DISTWIDTH] IN BLOOD BY AUTOMATED COUNT: 13.6 % (ref 11.5–16)
GLOBULIN PLAS-MCNC: 3.8 G/DL (ref 2.8–4.4)
GLUCOSE BLD-MCNC: 94 MG/DL (ref 70–99)
HCT VFR BLD AUTO: 39 % (ref 34–50)
HGB BLD-MCNC: 12.9 G/DL (ref 12–16)
IMMATURE GRANULOCYTE COUNT: 0.1 X10(3) UL (ref 0–1)
IMMATURE GRANULOCYTE RATIO %: 0.8 %
LYMPHOCYTES # BLD AUTO: 1.53 X10(3) UL (ref 0.9–4)
LYMPHOCYTES NFR BLD AUTO: 12.6 %
M PROTEIN MFR SERPL ELPH: 7.2 G/DL (ref 6.4–8.2)
MCH RBC QN AUTO: 30.6 PG (ref 27–33.2)
MCHC RBC AUTO-ENTMCNC: 33.1 G/DL (ref 31–37)
MCV RBC AUTO: 92.4 FL (ref 81–100)
MONOCYTES # BLD AUTO: 0.93 X10(3) UL (ref 0.1–1)
MONOCYTES NFR BLD AUTO: 7.6 %
NEUTROPHIL ABS PRELIM: 9.49 X10 (3) UL (ref 1.3–6.7)
NEUTROPHILS # BLD AUTO: 9.49 X10(3) UL (ref 1.3–6.7)
NEUTROPHILS NFR BLD AUTO: 78 %
OSMOLALITY SERPL CALC.SUM OF ELEC: 290 MOSM/KG (ref 275–295)
PLATELET # BLD AUTO: 213 10(3)UL (ref 150–450)
POTASSIUM SERPL-SCNC: 4.7 MMOL/L (ref 3.6–5.1)
RBC # BLD AUTO: 4.22 X10(6)UL (ref 3.8–5.1)
RED CELL DISTRIBUTION WIDTH-SD: 45.9 FL (ref 35.1–46.3)
SODIUM SERPL-SCNC: 139 MMOL/L (ref 136–144)
TROPONIN I SERPL-MCNC: <0.046 NG/ML (ref ?–0.05)
WBC # BLD AUTO: 12.2 X10(3) UL (ref 4–13)

## 2019-01-24 PROCEDURE — 99223 1ST HOSP IP/OBS HIGH 75: CPT | Performed by: HOSPITALIST

## 2019-01-24 PROCEDURE — 72131 CT LUMBAR SPINE W/O DYE: CPT | Performed by: EMERGENCY MEDICINE

## 2019-01-24 PROCEDURE — 71045 X-RAY EXAM CHEST 1 VIEW: CPT | Performed by: EMERGENCY MEDICINE

## 2019-01-24 PROCEDURE — 72100 X-RAY EXAM L-S SPINE 2/3 VWS: CPT | Performed by: EMERGENCY MEDICINE

## 2019-01-24 RX ORDER — DOCUSATE SODIUM 100 MG/1
100 CAPSULE, LIQUID FILLED ORAL 2 TIMES DAILY
Status: DISCONTINUED | OUTPATIENT
Start: 2019-01-24 | End: 2019-01-24

## 2019-01-24 RX ORDER — TORSEMIDE 20 MG/1
20 TABLET ORAL DAILY
Status: DISCONTINUED | OUTPATIENT
Start: 2019-01-24 | End: 2019-02-02

## 2019-01-24 RX ORDER — ACETAMINOPHEN 500 MG
1000 TABLET ORAL ONCE
Status: COMPLETED | OUTPATIENT
Start: 2019-01-24 | End: 2019-01-24

## 2019-01-24 RX ORDER — CEPHALEXIN 500 MG/1
500 CAPSULE ORAL 3 TIMES DAILY
COMMUNITY
End: 2019-02-05

## 2019-01-24 RX ORDER — BISACODYL 10 MG
10 SUPPOSITORY, RECTAL RECTAL
Status: DISCONTINUED | OUTPATIENT
Start: 2019-01-24 | End: 2019-02-02

## 2019-01-24 RX ORDER — MORPHINE SULFATE 4 MG/ML
2 INJECTION, SOLUTION INTRAMUSCULAR; INTRAVENOUS EVERY 2 HOUR PRN
Status: DISCONTINUED | OUTPATIENT
Start: 2019-01-24 | End: 2019-02-02

## 2019-01-24 RX ORDER — CEPHALEXIN 500 MG/1
500 CAPSULE ORAL EVERY 8 HOURS SCHEDULED
Status: DISCONTINUED | OUTPATIENT
Start: 2019-01-24 | End: 2019-01-30

## 2019-01-24 RX ORDER — MORPHINE SULFATE 4 MG/ML
4 INJECTION, SOLUTION INTRAMUSCULAR; INTRAVENOUS EVERY 2 HOUR PRN
Status: DISCONTINUED | OUTPATIENT
Start: 2019-01-24 | End: 2019-02-02

## 2019-01-24 RX ORDER — ACETAMINOPHEN 325 MG/1
650 TABLET ORAL EVERY 6 HOURS PRN
Status: DISCONTINUED | OUTPATIENT
Start: 2019-01-24 | End: 2019-02-02

## 2019-01-24 RX ORDER — HYDRALAZINE HYDROCHLORIDE 50 MG/1
50 TABLET, FILM COATED ORAL 3 TIMES DAILY
Status: DISCONTINUED | OUTPATIENT
Start: 2019-01-24 | End: 2019-02-02

## 2019-01-24 RX ORDER — ASPIRIN 81 MG/1
81 TABLET, CHEWABLE ORAL DAILY
Status: DISCONTINUED | OUTPATIENT
Start: 2019-01-24 | End: 2019-02-02

## 2019-01-24 RX ORDER — ALLOPURINOL 100 MG/1
100 TABLET ORAL DAILY
Status: DISCONTINUED | OUTPATIENT
Start: 2019-01-24 | End: 2019-02-02

## 2019-01-24 RX ORDER — CHLORAL HYDRATE 500 MG
1000 CAPSULE ORAL DAILY
Status: DISCONTINUED | OUTPATIENT
Start: 2019-01-24 | End: 2019-01-24

## 2019-01-24 RX ORDER — HYDROCODONE BITARTRATE AND ACETAMINOPHEN 5; 325 MG/1; MG/1
1 TABLET ORAL EVERY 4 HOURS PRN
Status: DISCONTINUED | OUTPATIENT
Start: 2019-01-24 | End: 2019-02-02

## 2019-01-24 RX ORDER — METOCLOPRAMIDE HYDROCHLORIDE 5 MG/ML
5 INJECTION INTRAMUSCULAR; INTRAVENOUS EVERY 8 HOURS PRN
Status: DISCONTINUED | OUTPATIENT
Start: 2019-01-24 | End: 2019-02-02

## 2019-01-24 RX ORDER — TRAMADOL HYDROCHLORIDE 50 MG/1
50 TABLET ORAL ONCE
Status: COMPLETED | OUTPATIENT
Start: 2019-01-24 | End: 2019-01-24

## 2019-01-24 RX ORDER — TORSEMIDE 20 MG/1
20 TABLET ORAL DAILY
Status: DISCONTINUED | OUTPATIENT
Start: 2019-01-24 | End: 2019-01-24

## 2019-01-24 RX ORDER — POLYETHYLENE GLYCOL 3350 17 G/17G
17 POWDER, FOR SOLUTION ORAL DAILY PRN
Status: DISCONTINUED | OUTPATIENT
Start: 2019-01-24 | End: 2019-02-02

## 2019-01-24 RX ORDER — DOCUSATE SODIUM 100 MG/1
100 CAPSULE, LIQUID FILLED ORAL 2 TIMES DAILY
Status: DISCONTINUED | OUTPATIENT
Start: 2019-01-24 | End: 2019-02-02

## 2019-01-24 RX ORDER — ARIPIPRAZOLE 15 MG/1
20 TABLET ORAL DAILY
Status: DISCONTINUED | OUTPATIENT
Start: 2019-01-24 | End: 2019-02-02

## 2019-01-24 RX ORDER — METOPROLOL SUCCINATE 100 MG/1
100 TABLET, EXTENDED RELEASE ORAL
Status: DISCONTINUED | OUTPATIENT
Start: 2019-01-25 | End: 2019-02-02

## 2019-01-24 RX ORDER — TORSEMIDE 20 MG/1
20 TABLET ORAL DAILY
Status: ON HOLD | COMMUNITY
End: 2019-02-02

## 2019-01-24 RX ORDER — ONDANSETRON 2 MG/ML
4 INJECTION INTRAMUSCULAR; INTRAVENOUS EVERY 6 HOURS PRN
Status: DISCONTINUED | OUTPATIENT
Start: 2019-01-24 | End: 2019-02-02

## 2019-01-24 RX ORDER — SENNOSIDES 8.6 MG
8.6 TABLET ORAL NIGHTLY
Status: DISCONTINUED | OUTPATIENT
Start: 2019-01-24 | End: 2019-02-02

## 2019-01-24 RX ORDER — ACETAMINOPHEN 325 MG/1
650 TABLET ORAL EVERY 4 HOURS PRN
Status: DISCONTINUED | OUTPATIENT
Start: 2019-01-24 | End: 2019-02-02

## 2019-01-24 RX ORDER — ATORVASTATIN CALCIUM 10 MG/1
10 TABLET, FILM COATED ORAL NIGHTLY
Status: DISCONTINUED | OUTPATIENT
Start: 2019-01-24 | End: 2019-02-02

## 2019-01-24 RX ORDER — SODIUM PHOSPHATE, DIBASIC AND SODIUM PHOSPHATE, MONOBASIC 7; 19 G/133ML; G/133ML
1 ENEMA RECTAL ONCE AS NEEDED
Status: DISCONTINUED | OUTPATIENT
Start: 2019-01-24 | End: 2019-02-02

## 2019-01-24 RX ORDER — HYDROCODONE BITARTRATE AND ACETAMINOPHEN 5; 325 MG/1; MG/1
2 TABLET ORAL EVERY 4 HOURS PRN
Status: DISCONTINUED | OUTPATIENT
Start: 2019-01-24 | End: 2019-02-02

## 2019-01-24 RX ORDER — MORPHINE SULFATE 4 MG/ML
1 INJECTION, SOLUTION INTRAMUSCULAR; INTRAVENOUS EVERY 2 HOUR PRN
Status: DISCONTINUED | OUTPATIENT
Start: 2019-01-24 | End: 2019-02-02

## 2019-01-24 NOTE — ED PROVIDER NOTES
Patient Seen in: [facility] Emergency Department    History   Patient presents with:  Fall (musculoskeletal, neurologic)    Stated Complaint:     HPI    80-year-old female presents for evaluation after a fall.   Patient was attempting to stand next t normocephalic. Pupils equal reactive. Extraocular motions intact. Oropharynx clear. Neck: Supple, no C-spine TTP  Lungs: Clear to auscultation bilaterally. Heart: Regular rate and rhythm. Abdomen: Soft, nontender.   No hepatic or splenic tenderness on the printed ECG report. Ct Spine Lumbar (cpt=72131)    Result Date: 1/24/2019  PROCEDURE:  CT SPINE LUMBAR (CPT=72131)  COMPARISON:  [facility] , XR LUMBAR SPINE (MIN 2 VIEWS) (CPT=72100), 1/24/2019, 17:28.   INDICATIONS:  fall, back pain  TECHNIQUE:  Non body this includes the superior endplate, mid body, posterior cortex approximately 20/25% loss of vertebral body height.   There is slight posterior wall expansion to the left of midline in contact with the left-sided nerve root without absolute canal steno

## 2019-01-24 NOTE — ED INITIAL ASSESSMENT (HPI)
Pt here for fall in her assistant living. Caregiver heard pt. Pt denies hitting head or loc. Pt states back pain. Pt denies any lightheadness or dizziness.

## 2019-01-25 ENCOUNTER — APPOINTMENT (OUTPATIENT)
Dept: INTERVENTIONAL RADIOLOGY/VASCULAR | Facility: HOSPITAL | Age: 84
DRG: 515 | End: 2019-01-25
Attending: HOSPITALIST
Payer: MEDICARE

## 2019-01-25 ENCOUNTER — APPOINTMENT (OUTPATIENT)
Dept: CV DIAGNOSTICS | Facility: HOSPITAL | Age: 84
DRG: 515 | End: 2019-01-25
Attending: HOSPITALIST
Payer: MEDICARE

## 2019-01-25 LAB
ATRIAL RATE: 98 BPM
INR BLD: 1.1 (ref 0.9–1.1)
PSA SERPL DL<=0.01 NG/ML-MCNC: 14.7 SECONDS (ref 12.4–14.7)
Q-T INTERVAL: 424 MS
QRS DURATION: 136 MS
QTC CALCULATION (BEZET): 479 MS
R AXIS: -84 DEGREES
T AXIS: -13 DEGREES
VENTRICULAR RATE: 77 BPM

## 2019-01-25 PROCEDURE — 0QS03ZZ REPOSITION LUMBAR VERTEBRA, PERCUTANEOUS APPROACH: ICD-10-PCS | Performed by: RADIOLOGY

## 2019-01-25 PROCEDURE — 0QU03JZ SUPPLEMENT LUMBAR VERTEBRA WITH SYNTHETIC SUBSTITUTE, PERCUTANEOUS APPROACH: ICD-10-PCS | Performed by: RADIOLOGY

## 2019-01-25 PROCEDURE — 99232 SBSQ HOSP IP/OBS MODERATE 35: CPT | Performed by: HOSPITALIST

## 2019-01-25 RX ORDER — FLUMAZENIL 0.1 MG/ML
INJECTION, SOLUTION INTRAVENOUS
Status: COMPLETED
Start: 2019-01-25 | End: 2019-01-25

## 2019-01-25 RX ORDER — NALOXONE HYDROCHLORIDE 0.4 MG/ML
INJECTION, SOLUTION INTRAMUSCULAR; INTRAVENOUS; SUBCUTANEOUS
Status: COMPLETED
Start: 2019-01-25 | End: 2019-01-25

## 2019-01-25 RX ORDER — LIDOCAINE HYDROCHLORIDE 10 MG/ML
INJECTION, SOLUTION INFILTRATION; PERINEURAL
Status: COMPLETED
Start: 2019-01-25 | End: 2019-01-25

## 2019-01-25 RX ORDER — CEFAZOLIN SODIUM/WATER 2 G/20 ML
SYRINGE (ML) INTRAVENOUS
Status: COMPLETED
Start: 2019-01-25 | End: 2019-01-25

## 2019-01-25 RX ORDER — HYDRALAZINE HYDROCHLORIDE 20 MG/ML
10 INJECTION INTRAMUSCULAR; INTRAVENOUS ONCE
Status: COMPLETED | OUTPATIENT
Start: 2019-01-25 | End: 2019-01-25

## 2019-01-25 RX ORDER — MIDAZOLAM HYDROCHLORIDE 1 MG/ML
INJECTION INTRAMUSCULAR; INTRAVENOUS
Status: COMPLETED
Start: 2019-01-25 | End: 2019-01-25

## 2019-01-25 NOTE — PROGRESS NOTES
Pharmacy Note: Renal dose adjustment for Metoclopramide (Reglan)  Jyoti Alas has been prescribed Metoclopramide (Reglan) 10 mg every 8 hours as needed. Estimated Creatinine Clearance: 30.3 mL/min (A) (based on SCr of 1.25 mg/dL (H)).     Her calculated

## 2019-01-25 NOTE — H&P
GREG HOSPITALIST  History and Physical     Sammi Bruno Patient Status:  Emergency    1930 MRN [id number]   Location [facility] Attending German Francis, *   Hosp Day # 0 PCP Pablo Ledesma DO     Chief Complai Blocker. Disp: 30 tablet Rfl: 5   furosemide 20 MG Oral Tab Take 1 tablet (20 mg total) by mouth BID (Diuretic). Disp: 60 tablet Rfl: 5   ARIPiprazole 2 MG Oral Tab Take 0.5 tablets (1 mg total) by mouth daily.  Disp: 30 tablet Rfl: 0   QUEtiapine Fumarate pulses. Chest and Back: TTP @ L2  Abdomen: Soft, nontender, nondistended. Positive bowel sounds. No rebound, guarding or organomegaly. Neurologic: No focal neurological deficits. CNII-XII grossly intact. Musculoskeletal: Moves all extremities.   Extrem

## 2019-01-25 NOTE — PROGRESS NOTES
GREG HOSPITALIST  Progress Note     Jim Ramirez Patient Status:  Observation    1930 MRN [id number]   [facility] 3SW-A Attending Joelle Landry MD   Hosp Day # 0 PCP Sylvia Cabral DO     Chief Complaint: back pain, fall    S: Sabine Bark torsemide  20 mg Oral Daily   • aspirin  81 mg Oral Daily       ASSESSMENT / PLAN:     1. Recurrent falls  1. Given dementia hx is limited and pt does not recall events  2. Echo, cards eval if abnormal  3. Unable to do orthostatics with vertebral fx  2.  Ch

## 2019-01-25 NOTE — ED NOTES
Report given to next shift RN Lenin Deven. Patient getting cardiac workup due to reporting chest pain after CT scan.

## 2019-01-25 NOTE — ED NOTES
Rounded on patient, patient reporting mild pain relief with tylenol. ERMD at bedside giving patient and family update on POC, patient awaiting to have CT of spine.

## 2019-01-25 NOTE — PLAN OF CARE
Off unit to IR accompanied by POA/son Scooter and  nursing student Joselo. Hearing aides in cup in room    1530 - received from IR. Very drowsy, easily arousable but falls back asleep easily. Spit up some mucus, mouth area cleansed. Given some ice chips.  Still

## 2019-01-25 NOTE — PROGRESS NOTES
NURSING ADMISSION NOTE      Patient admitted via Cart  Oriented to room. Safety precautions initiated. Bed in low position. Call light in reach. PT. RECEIVED FROM ER AWAKE AND ALERT-ORIENTED X 2. ADMITTING CARE RENDERED.  PT.AND PT.S SON UPDATED ON

## 2019-01-26 ENCOUNTER — APPOINTMENT (OUTPATIENT)
Dept: CV DIAGNOSTICS | Facility: HOSPITAL | Age: 84
DRG: 515 | End: 2019-01-26
Attending: HOSPITALIST
Payer: MEDICARE

## 2019-01-26 PROBLEM — R09.02 HYPOXIA: Status: ACTIVE | Noted: 2019-01-26

## 2019-01-26 PROCEDURE — 99232 SBSQ HOSP IP/OBS MODERATE 35: CPT | Performed by: HOSPITALIST

## 2019-01-26 PROCEDURE — 93306 TTE W/DOPPLER COMPLETE: CPT | Performed by: HOSPITALIST

## 2019-01-26 NOTE — OCCUPATIONAL THERAPY NOTE
OCCUPATIONAL THERAPY EVALUATION - INPATIENT     Room Number: 364/364-A  Evaluation Date: 1/26/2019  Type of Evaluation: Initial  Presenting Problem: L2 fx now s/p kyphoplasty 1/25/19    Physician Order: IP Consult to Occupational Therapy  Reason for [facility] was scheduled to move to [facility] this coming Monday d/t frequent falls and increased need for assistance. SUBJECTIVE   \"I will do whatever you want me to\"    Patient self-stated goal is less pain.       OBJECTIVE  Precaution Eating meals?: None    AM-PAC Score:  Score: 17  Approx Degree of Impairment: 50.11%  Standardized Score (AM-PAC Scale): 37.26  CMS Modifier (G-Code): CK    FUNCTIONAL TRANSFER ASSESSMENT  Supine to Sit : Moderate assistance  Sit to Stand: Minimum assistan leisure and social participation. The patient is functioning below her previous functional level and would benefit from skilled inpatient OT to address the above deficits, maximizing patient’s ability to return safely to her prior level of function. sit-stand w/ supervision    Additional Goals:  Pt will recall 3/3 spinal precautions w/o cueing.

## 2019-01-26 NOTE — PROGRESS NOTES
Noted O2 sat 84-85% on 5l/nc. Pt drowsy but easily awaken. Denies CP or SOB. O2 increased to 6l/nc. RT (Noah Anderson) informed, stated to switch pt to hi-blu nasal cannula.

## 2019-01-26 NOTE — PLAN OF CARE
PAIN - ADULT    • Verbalizes/displays adequate comfort level or patient's stated pain goal Progressing        SAFETY ADULT - FALL    • Free from fall injury Progressing        Post Kyphoplasty day 1, lower back with Telfa dressing in place, scant amount of

## 2019-01-26 NOTE — PHYSICAL THERAPY NOTE
PHYSICAL THERAPY EVALUATION - INPATIENT     Room Number: 364/364-A  Evaluation Date: 1/26/2019  Type of Evaluation: Initial  Physician Order: PT Eval and Treat    Presenting Problem: L2 compression fx s/p vertebroplasty  Reason for Therapy: Mobility assistance and frequent falls.     SUBJECTIVE  \"I don't have any pain right now\" (at rest in supine)    Patient self-stated goal is not stated    OBJECTIVE  Precautions: Limb alert - left;Spine;Bed/chair alarm  Fall Risk: High fall risk    WEIGHT BEARING Impairment: 57.7%   Standardized Score (AM-PAC Scale): 39.45   CMS Modifier (G-Code): CK    FUNCTIONAL ABILITY STATUS  Gait Assessment   Gait Assistance: Dependent assistance(per FIM definitions; actually min A)  Distance (ft): 8(x2)  Assistive Device: Rol bed mobility, transfers, and gait. The patient is below baseline and would benefit from skilled inpatient PT to address the above deficits to assist patient in returning to prior to level of function. Subacute rehab is recommended for 11-12 days.   After t

## 2019-01-26 NOTE — PROGRESS NOTES
GREG HOSPITALIST  Progress Note     Jim Ramirez Patient Status:  Observation    1930 MRN [id number]   [facility] 3SW-A Attending Joelle Landry MD   Hosp Day # 0 PCP Sylvia Cabral DO     Chief Complaint: back pain, fall    S: [place] cephALEXin  500 mg Oral Q8H [facility]   • torsemide  20 mg Oral Daily   • aspirin  81 mg Oral Daily       ASSESSMENT / PLAN:     1. Recurrent falls  1. Given dementia hx is limited and pt does not recall events  2. Echo pending  2. Chest pain  1.  Due to fall and

## 2019-01-27 ENCOUNTER — APPOINTMENT (OUTPATIENT)
Dept: GENERAL RADIOLOGY | Facility: HOSPITAL | Age: 84
DRG: 515 | End: 2019-01-27
Attending: HOSPITALIST
Payer: MEDICARE

## 2019-01-27 PROCEDURE — 73502 X-RAY EXAM HIP UNI 2-3 VIEWS: CPT | Performed by: HOSPITALIST

## 2019-01-27 PROCEDURE — 99232 SBSQ HOSP IP/OBS MODERATE 35: CPT | Performed by: HOSPITALIST

## 2019-01-27 NOTE — CM/SW NOTE
Spoke with pt's RN who stated pt's family requesting referral to [facility] for [facility]. Pt seen by PT with [facility] recommended. Possible DC tomorrow. Discussed that [address] [place] [address] has limited availability at this time and encouraged back up plan.   Pt's family stating Kayla

## 2019-01-27 NOTE — OCCUPATIONAL THERAPY NOTE
Attempted to see Pt for OT tx. Pt is awaiting x-ray for possible hip fx. Will re-attempt when appropriate as schedule allows.

## 2019-01-27 NOTE — PHYSICAL THERAPY NOTE
PHYSICAL THERAPY TREATMENT NOTE - INPATIENT    Room Number: 364/364-A     Session: 1     Number of Visits to Meet Established Goals: 5    Presenting Problem: L2 compression fx s/p vertebroplasty    Problem List  Principal Problem:    Fall, initial encount bedside commode, etc.): A Little   -   Moving from lying on back to sitting on the side of the bed?: A Little   How much help from another person does the patient currently need. ..   -   Moving to and from a bed to a chair (including a wheelchair)?: A Afshan left anterior hip pain, more in the groin. Pt also with mild difficulty breathing. Pt on 5 liters of O2 and O2 sats at 94%. Pt required cueing throughout for proper body mechanics and technique with bed mobility and sit<>stand.  Pt with reports of this left

## 2019-01-27 NOTE — PLAN OF CARE
DISCHARGE PLANNING    • Discharge to home or other facility with appropriate resources Progressing        Impaired Activities of Daily Living    • Achieve highest/safest level of independence in self care Progressing        Impaired Functional Mobility patient. At times, incontinent of urine, which patient states is baseline. Will continue to monitor.

## 2019-01-27 NOTE — PLAN OF CARE
PAIN - ADULT    • Verbalizes/displays adequate comfort level or patient's stated pain goal Progressing        SAFETY ADULT - FALL    • Free from fall injury Progressing        OOB with therapy with mod assist.  No c/o pain while in bed, left side and hip p

## 2019-01-27 NOTE — PROGRESS NOTES
GREG HOSPITALIST  Progress Note     Daya Kenny Patient Status:  Observation    1930 MRN [id number]   [facility] 3SW-A Attending Aby Sifuentes MD   Hosp Day # 1 PCP Jennifer Bermeo, DO     Chief Complaint: back pain, fall    S: Evelyn Seymour Oral TID   • atorvastatin  10 mg Oral Nightly   • Metoprolol Succinate ER  100 mg Oral Daily Beta Blocker   • cephALEXin  500 mg Oral Q8H [address]   • torsemide  20 mg Oral Daily   • aspirin  81 mg Oral Daily       ASSESSMENT / PLAN:     1. Recurrent falls  1.

## 2019-01-27 NOTE — PLAN OF CARE
Patient was pulling of kerlix elbow dressing, so it was removed. Telfa dressing with coverlet placed over to protect site. Patient agreeable to leaving dressing in place.

## 2019-01-28 ENCOUNTER — APPOINTMENT (OUTPATIENT)
Dept: GENERAL RADIOLOGY | Facility: HOSPITAL | Age: 84
DRG: 515 | End: 2019-01-28
Attending: HOSPITALIST
Payer: MEDICARE

## 2019-01-28 PROCEDURE — 71045 X-RAY EXAM CHEST 1 VIEW: CPT | Performed by: HOSPITALIST

## 2019-01-28 PROCEDURE — 99232 SBSQ HOSP IP/OBS MODERATE 35: CPT | Performed by: HOSPITALIST

## 2019-01-28 RX ORDER — FUROSEMIDE 10 MG/ML
40 INJECTION INTRAMUSCULAR; INTRAVENOUS ONCE
Status: COMPLETED | OUTPATIENT
Start: 2019-01-28 | End: 2019-01-28

## 2019-01-28 NOTE — PROGRESS NOTES
GREG HOSPITALIST  Progress Note     Veda Angelucci Patient Status:  Observation    1930 MRN [id number]   [facility] 3SW-A Attending Florentin Kilgore MD   [facility] Day # 2 PCP Jean Pierre Malone, DO     Chief Complaint: back pain, fall    S: inc 500 mg Oral Q8H [address]   • torsemide  20 mg Oral Daily   • aspirin  81 mg Oral Daily       ASSESSMENT / PLAN:     1. Recurrent falls  1. Given dementia hx is limited and pt does not recall events  2. Chest pain  1. Due to fall and rib contusions  2. IS  3.  V

## 2019-01-28 NOTE — CM/SW NOTE
Call from Tracy with [facility]. They will not have subaucte bed availability until late this week. SW/CM to f/u with pt for alternate plan.      Stefan Rai RN, [facility]  Spectra [phone]

## 2019-01-28 NOTE — PLAN OF CARE
Dr. Faith Kennedy paged with update regarding pt requiring 7L at rest in chair, sating at 93%. Pt doesn't voice SOB but does have .

## 2019-01-28 NOTE — PHYSICAL THERAPY NOTE
PHYSICAL THERAPY TREATMENT NOTE - INPATIENT    Room Number: 364/364-A     Session: 2   Number of Visits to Meet Established Goals: 5    Presenting Problem: L2 compression fx s/p vertebroplasty    Problem List  Principal Problem:    Fall, initial encounter patient currently need. ..   -   Moving to and from a bed to a chair (including a wheelchair)?: A Little   -   Need to walk in hospital room?: A Little   -   Climbing 3-5 steps with a railing?: A Lot       AM-PAC Score:  Raw Score: 17   Approx Degree of Imp mobility; Endurance; Patient education;Gait training;Strengthening;Transfer training;Balance training; Body mechanics  Rehab Potential : Good  Frequency (Obs): 5x/week    CURRENT GOALS     Goal #1 Patient is able to demonstrate supine - sit EOB @ level: minim

## 2019-01-28 NOTE — OCCUPATIONAL THERAPY NOTE
OCCUPATIONAL THERAPY TREATMENT NOTE - INPATIENT     Room Number: 364/364-A  Session:   1/5  Number of Visits to Meet Established Goals: 5    Presenting Problem: L2 fx now s/p kyphoplasty 1/25/19    History related to current admission:     Pt is 80 year ol Bathing (including washing, rinsing, drying)?: A Lot  -   Toileting, which includes using toilet, bedpan or urinal? : A Lot  -   Putting on and taking off regular upper body clothing?: A Lot  -   Taking care of personal grooming such as brushing teeth?: A Sub-acute rehabilitation(vnay84-16 days)  OT Device Recommendations: None    PLAN  OT Treatment Plan: Balance activities; Energy conservation/work simplification techniques;ADL training;Functional transfer training;Patient/Family education;Equipment eval/ed

## 2019-01-28 NOTE — CM/SW NOTE
LAURA spoke with Cordelia Russell at [address] [phone]. Pt is accepted. SW noted d/c expected in 1-2 days. LAURA updated son re:  [facility] unit having no beds currently but above [facility] has accepted. He agrees with plan for [facility].     Alisa Rodriguez

## 2019-01-29 ENCOUNTER — PRIOR ORIGINAL RECORDS (OUTPATIENT)
Dept: OTHER | Age: 84
End: 2019-01-29

## 2019-01-29 LAB
ALLENS TEST: POSITIVE
ANION GAP SERPL CALC-SCNC: 5 MMOL/L (ref 0–18)
ARTERIAL BLD GAS O2 SATURATION: 94 % (ref 92–100)
ARTERIAL BLOOD GAS BASE EXCESS: 5.2
ARTERIAL BLOOD GAS HCO3: 31.4 MEQ/L (ref 22–26)
ARTERIAL BLOOD GAS PCO2: 53 MM HG (ref 35–45)
ARTERIAL BLOOD GAS PH: 7.39 (ref 7.35–7.45)
ARTERIAL BLOOD GAS PO2: 76 MM HG (ref 80–105)
BUN BLD-MCNC: 35 MG/DL (ref 8–20)
BUN/CREAT SERPL: 31.5 (ref 10–20)
CALCIUM BLD-MCNC: 9.2 MG/DL (ref 8.3–10.3)
CALCULATED O2 SATURATION: 95 % (ref 92–100)
CARBOXYHEMOGLOBIN: 1.8 % SAT (ref 0–3)
CHLORIDE SERPL-SCNC: 100 MMOL/L (ref 101–111)
CO2 SERPL-SCNC: 32 MMOL/L (ref 22–32)
CREAT BLD-MCNC: 1.11 MG/DL (ref 0.55–1.02)
GLUCOSE BLD-MCNC: 107 MG/DL (ref 70–99)
L/M: 4 L/MIN
METHEMOGLOBIN: 0.6 % SAT (ref 0.4–1.5)
OSMOLALITY SERPL CALC.SUM OF ELEC: 292 MOSM/KG (ref 275–295)
P/F RATIO: 217 MMHG
PATIENT TEMPERATURE: 97.8 F
POTASSIUM SERPL-SCNC: 4.6 MMOL/L (ref 3.6–5.1)
SODIUM SERPL-SCNC: 137 MMOL/L (ref 136–144)
TOTAL HEMOGLOBIN: 11.9 G/DL (ref 11.7–16)

## 2019-01-29 PROCEDURE — 99232 SBSQ HOSP IP/OBS MODERATE 35: CPT | Performed by: HOSPITALIST

## 2019-01-29 RX ORDER — IPRATROPIUM BROMIDE AND ALBUTEROL SULFATE 2.5; .5 MG/3ML; MG/3ML
3 SOLUTION RESPIRATORY (INHALATION)
Status: DISCONTINUED | OUTPATIENT
Start: 2019-01-29 | End: 2019-01-31

## 2019-01-29 NOTE — PLAN OF CARE
Patient taking norco for pain with good relief noted per patient. A/O FF at times. Doesn't use the call light just yells out when she wants something. O2 8L nc and at night patient uses a simple face mask at 5L because patient is a mouth breather.  Left for

## 2019-01-29 NOTE — PLAN OF CARE
Spoke with Dr. Sanchez Galvan about pt's O2 requirements, also spoke with RT about their recommendations. Pt was transferred from chair to bed, more drowsy this am than yesterday. ABG drawn. Dr. Sanchez Galvan to decide if pulm consult needed.  RT recommending venti mask

## 2019-01-29 NOTE — PHYSICAL THERAPY NOTE
PHYSICAL THERAPY TREATMENT NOTE - INPATIENT    Room Number: 364/364-A     Session: 3   Number of Visits to Meet Established Goals: 5    Presenting Problem: L2 compression fx s/p vertebroplasty    Problem List  Principal Problem:    Fall, initial encounter lying on back to sitting on the side of the bed?: A Little   How much help from another person does the patient currently need. ..   -   Moving to and from a bed to a chair (including a wheelchair)?: A Little   -   Need to walk in hospital room?: A Little patient is demonstrating a 50.57% degree of impairment in mobility. Research supports that patients with this level of impairment may benefit from DC to [facility].        DISCHARGE RECOMMENDATIONS  PT Discharge Recommendations: Sub-acute rehabilitation     PLAN

## 2019-01-29 NOTE — PLAN OF CARE
Pt is mouth breather, while sleeping, RN placed on simple mask. Pt is sating at 95% on 5L with this.

## 2019-01-29 NOTE — PROGRESS NOTES
GREG HOSPITALIST  Progress Note     Jyoti Evette Patient Status:  Observation    1930 MRN [id number]   [facility] 3SW-A Attending Inés Luis MD   [facility] Day # 3 PCP Mary Jane Atkins DO     Chief Complaint: back pain, fall    S: nee cephALEXin  500 mg Oral Q8H [facility]   • torsemide  20 mg Oral Daily   • aspirin  81 mg Oral Daily       ASSESSMENT / PLAN:     1. Recurrent falls  1. Given dementia hx is limited and pt does not recall events  2. Hypoxia  1. ABG reviewed  2.  Multifactorial due

## 2019-01-30 ENCOUNTER — APPOINTMENT (OUTPATIENT)
Dept: GENERAL RADIOLOGY | Facility: HOSPITAL | Age: 84
DRG: 515 | End: 2019-01-30
Attending: INTERNAL MEDICINE
Payer: MEDICARE

## 2019-01-30 LAB — PRO-BETA NATRIURETIC PEPTIDE: 3146 PG/ML (ref ?–450)

## 2019-01-30 PROCEDURE — 99232 SBSQ HOSP IP/OBS MODERATE 35: CPT | Performed by: INTERNAL MEDICINE

## 2019-01-30 PROCEDURE — 71045 X-RAY EXAM CHEST 1 VIEW: CPT | Performed by: INTERNAL MEDICINE

## 2019-01-30 RX ORDER — FUROSEMIDE 10 MG/ML
40 INJECTION INTRAMUSCULAR; INTRAVENOUS DAILY
Status: DISCONTINUED | OUTPATIENT
Start: 2019-01-30 | End: 2019-02-01

## 2019-01-30 RX ORDER — HYDROCODONE BITARTRATE AND ACETAMINOPHEN 5; 325 MG/1; MG/1
1 TABLET ORAL EVERY 4 HOURS PRN
Qty: 20 TABLET | Refills: 0 | Status: SHIPPED | OUTPATIENT
Start: 2019-01-30 | End: 2019-02-05

## 2019-01-30 NOTE — OCCUPATIONAL THERAPY NOTE
OCCUPATIONAL THERAPY TREATMENT NOTE - INPATIENT     Room Number: 364/364-A  Session: 2/5  Number of Visits to Meet Established Goals: 5    Presenting Problem: L2 fx now s/p kyphoplasty 1/25/19    History related to current admission:  Pt is 80year old fem includes using toilet, bedpan or urinal? : A Lot  -   Putting on and taking off regular upper body clothing?: A Lot  -   Taking care of personal grooming such as brushing teeth?: A Little  -   Eating meals?: A Little    AM-PAC Score:  Score: 14  Approx Deg toileting: with supervision, keeping spinal precautions  Pt will verbalize methods of keeping spinal precautions during UB/LB bathing.     Functional Transfer Goals  Patient will transfer from sit to supine at mod ind and while maintaining spinal precautio

## 2019-01-30 NOTE — PROGRESS NOTES
GREG HOSPITALIST  Progress Note     Amanda Piper Patient Status:  Observation    1930 MRN [id number]   [facility] 3SW-A Attending Thony Diego MD   [facility] Day # 4 PCP Shawn Cheng DO     Chief Complaint: back pain, fall    S: Pt 100 mg Oral Daily   • docusate sodium  100 mg Oral BID   • Senna  8.6 mg Oral Nightly   • hydrALAzine HCl  50 mg Oral TID   • atorvastatin  10 mg Oral Nightly   • Metoprolol Succinate ER  100 mg Oral Daily Beta Blocker   • cephALEXin  500 mg Oral [facility]

## 2019-01-30 NOTE — PROGRESS NOTES
Pt had 8 secs of Vtach, patient asymptomatic. Dr. Michael Mclean made aware    7762: [place] NC changed to regular NC.  Patient currently @ 3L of O2 saturating 94%

## 2019-01-30 NOTE — CONSULTS
Alfie Chavez [facility]  Pulmonary/Critical Care Consult Note  [facility]  Report of Consultation    Coralorenzo Piper Patient Status:  Inpatient    1930 MRN [id number]   [facility] 3SW-A Attending Tamera Sanders ipratropium-albuterol  3 mL Nebulization 4 times per day   • apixaban  2.5 mg Oral BID   • potassium chloride  20 mEq Oral Daily   • allopurinol  100 mg Oral Daily   • docusate sodium  100 mg Oral BID   • Senna  8.6 mg Oral Nightly   • hydrALAzine HCl  50 scars, no crepitus, normal flexion/extension  CHEST: Normal chest excursion, no visible deformity or scars, no tenderness to palpation  PULMONARY:diminished bilaterally, no wheezing rhonchi or crackles heard  COR:  Irregular rate/rhythm, afib on monitor, n (czy=[id number])    Result Date: 1/27/2019  CONCLUSION:   No evidence of fracture of the left hip. Left femoral head and proximal diaphysis left femur are unremarkable.   Marked cortical irregularity the pubic symphysis along with the right superior inferior pu

## 2019-01-30 NOTE — PHYSICAL THERAPY NOTE
PHYSICAL THERAPY TREATMENT NOTE - INPATIENT    Room Number: 364/364-A     Session: 4   Number of Visits to Meet Established Goals: 5    Presenting Problem: L2 compression fx s/p vertebroplasty    Problem List  Principal Problem:    Fall, initial encounter from lying on back to sitting on the side of the bed?: A Little   How much help from another person does the patient currently need. ..   -   Moving to and from a bed to a chair (including a wheelchair)?: A Little   -   Need to walk in hospital room?: A Lit 5x/week    CURRENT GOALS     Goal #1 Patient is able to demonstrate supine - sit EOB @ level: minimum assistance      Goal #2 Patient is able to demonstrate transfers Sit to/from Stand at assistance level: supervision      Goal #3 Patient is able to HAYLIE GARCIA JR[facility]

## 2019-01-31 ENCOUNTER — APPOINTMENT (OUTPATIENT)
Dept: GENERAL RADIOLOGY | Facility: HOSPITAL | Age: 84
DRG: 515 | End: 2019-01-31
Attending: INTERNAL MEDICINE
Payer: MEDICARE

## 2019-01-31 LAB
ALLENS TEST: POSITIVE
ARTERIAL BLD GAS O2 SATURATION: 91 % (ref 92–100)
ARTERIAL BLOOD GAS BASE EXCESS: 8.4
ARTERIAL BLOOD GAS HCO3: 34.2 MEQ/L (ref 22–26)
ARTERIAL BLOOD GAS PCO2: 53 MM HG (ref 35–45)
ARTERIAL BLOOD GAS PH: 7.42 (ref 7.35–7.45)
ARTERIAL BLOOD GAS PO2: 62 MM HG (ref 80–105)
CALCULATED O2 SATURATION: 93 % (ref 92–100)
CARBOXYHEMOGLOBIN: 1.8 % SAT (ref 0–3)
L/M: 3.5 L/MIN
METHEMOGLOBIN: 0.5 % SAT (ref 0.4–1.5)
PATIENT TEMPERATURE: 98.6 F
TOTAL HEMOGLOBIN: 11.7 G/DL (ref 11.7–16)

## 2019-01-31 PROCEDURE — 71045 X-RAY EXAM CHEST 1 VIEW: CPT | Performed by: INTERNAL MEDICINE

## 2019-01-31 PROCEDURE — 99232 SBSQ HOSP IP/OBS MODERATE 35: CPT | Performed by: INTERNAL MEDICINE

## 2019-01-31 RX ORDER — IPRATROPIUM BROMIDE AND ALBUTEROL SULFATE 2.5; .5 MG/3ML; MG/3ML
3 SOLUTION RESPIRATORY (INHALATION)
Status: DISCONTINUED | OUTPATIENT
Start: 2019-02-01 | End: 2019-02-02

## 2019-01-31 NOTE — CM/SW NOTE
LAURA sent update via [facility] to [id number] [phone]. Will arrange for ambulance transport to [facility] when pt is medically cleared for d/c.

## 2019-01-31 NOTE — PROGRESS NOTES
GREG HOSPITALIST  Progress Note      Patient Status:  Observation    1930 MRN [id number]   [facility] 3SW-A Attending Doni Cason MD   [address] Day # 5 PCP Juan A Dobson DO     Chief Complaint: back pain, fall    S: Pt allopurinol  100 mg Oral Daily   • docusate sodium  100 mg Oral BID   • Senna  8.6 mg Oral Nightly   • hydrALAzine HCl  50 mg Oral TID   • atorvastatin  10 mg Oral Nightly   • Metoprolol Succinate ER  100 mg Oral Daily Beta Blocker   • torsemide  20 mg Ora

## 2019-01-31 NOTE — PROGRESS NOTES
[facility]  Progress Note    Versa Nipple Patient Status:  Inpatient    1930 MRN [id number]   [facility] 3SW-A Attending Haider Frazier MD   [facility] Day # 5 PCP Jessica Bautista DO     Subjective:  Versa Nipple is a(n) 80year old and flutter ([facility]     Hypertensive urgency     Acute on chronic combined systolic and diastolic CHF (congestive heart failure) ([facility]     Coronary artery disease involving native heart     Congestive heart failure ([facility] [address]     ALDO (acute kidney injury) ([facility]

## 2019-01-31 NOTE — PHYSICAL THERAPY NOTE
PHYSICAL THERAPY TREATMENT NOTE - INPATIENT    Room Number: 364/364-A     Session: 5   Number of Visits to Meet Established Goals: 5    Presenting Problem: L2 compression fx s/p vertebroplasty    Problem List  Principal Problem:    Fall, initial encounter the bed?: A Little   How much help from another person does the patient currently need. ..   -   Moving to and from a bed to a chair (including a wheelchair)?: A Little   -   Need to walk in hospital room?: A Little   -   Climbing 3-5 steps with a railing?: mobility. The -PAC '6-Clicks' Inpatient Basic Mobility Short Form was completed and this patient is demonstrating a 50.57% degree of impairment in mobility. Research supports that patients with this level of impairment may benefit from DC to [facility].

## 2019-02-01 ENCOUNTER — APPOINTMENT (OUTPATIENT)
Dept: GENERAL RADIOLOGY | Facility: HOSPITAL | Age: 84
DRG: 515 | End: 2019-02-01
Attending: INTERNAL MEDICINE
Payer: MEDICARE

## 2019-02-01 LAB
ANION GAP SERPL CALC-SCNC: 6 MMOL/L (ref 0–18)
BASOPHILS # BLD AUTO: 0.05 X10(3) UL (ref 0–0.2)
BASOPHILS NFR BLD AUTO: 0.6 %
BUN BLD-MCNC: 31 MG/DL (ref 8–20)
BUN/CREAT SERPL: 33.3 (ref 10–20)
CALCIUM BLD-MCNC: 9.3 MG/DL (ref 8.3–10.3)
CHLORIDE SERPL-SCNC: 97 MMOL/L (ref 101–111)
CO2 SERPL-SCNC: 33 MMOL/L (ref 22–32)
CREAT BLD-MCNC: 0.93 MG/DL (ref 0.55–1.02)
DEPRECATED RDW RBC AUTO: 46 FL (ref 35.1–46.3)
EOSINOPHIL # BLD AUTO: 0.17 X10(3) UL (ref 0–0.7)
EOSINOPHIL NFR BLD AUTO: 2.1 %
ERYTHROCYTE [DISTWIDTH] IN BLOOD BY AUTOMATED COUNT: 13.7 % (ref 11–15)
GLUCOSE BLD-MCNC: 105 MG/DL (ref 70–99)
HCT VFR BLD AUTO: 34.4 % (ref 35–48)
HGB BLD-MCNC: 11.5 G/DL (ref 12–16)
IMM GRANULOCYTES # BLD AUTO: 0.1 X10(3) UL (ref 0–1)
IMM GRANULOCYTES NFR BLD: 1.3 %
LYMPHOCYTES # BLD AUTO: 1.25 X10(3) UL (ref 1–4)
LYMPHOCYTES NFR BLD AUTO: 15.7 %
MCH RBC QN AUTO: 30.7 PG (ref 26–34)
MCHC RBC AUTO-ENTMCNC: 33.4 G/DL (ref 31–37)
MCV RBC AUTO: 92 FL (ref 80–100)
MONOCYTES # BLD AUTO: 0.9 X10(3) UL (ref 0.1–1)
MONOCYTES NFR BLD AUTO: 11.3 %
NEUTROPHILS # BLD AUTO: 5.51 X10 (3) UL (ref 1.5–7.7)
NEUTROPHILS # BLD AUTO: 5.51 X10(3) UL (ref 1.5–7.7)
NEUTROPHILS NFR BLD AUTO: 69 %
OSMOLALITY SERPL CALC.SUM OF ELEC: 289 MOSM/KG (ref 275–295)
PHOSPHATE SERPL-MCNC: 3.3 MG/DL (ref 2.5–4.9)
PLATELET # BLD AUTO: 249 10(3)UL (ref 150–450)
POTASSIUM SERPL-SCNC: 3.8 MMOL/L (ref 3.6–5.1)
PRO-BETA NATRIURETIC PEPTIDE: 2458 PG/ML (ref ?–450)
RBC # BLD AUTO: 3.74 X10(6)UL (ref 3.8–5.3)
SODIUM SERPL-SCNC: 136 MMOL/L (ref 136–144)
WBC # BLD AUTO: 8 X10(3) UL (ref 4–11)

## 2019-02-01 PROCEDURE — 99232 SBSQ HOSP IP/OBS MODERATE 35: CPT | Performed by: INTERNAL MEDICINE

## 2019-02-01 PROCEDURE — 71045 X-RAY EXAM CHEST 1 VIEW: CPT | Performed by: INTERNAL MEDICINE

## 2019-02-01 RX ORDER — POTASSIUM CHLORIDE 20 MEQ/1
40 TABLET, EXTENDED RELEASE ORAL ONCE
Status: COMPLETED | OUTPATIENT
Start: 2019-02-01 | End: 2019-02-01

## 2019-02-01 RX ORDER — FUROSEMIDE 10 MG/ML
40 INJECTION INTRAMUSCULAR; INTRAVENOUS
Status: DISCONTINUED | OUTPATIENT
Start: 2019-02-01 | End: 2019-02-02

## 2019-02-01 NOTE — OCCUPATIONAL THERAPY NOTE
OCCUPATIONAL THERAPY TREATMENT NOTE - INPATIENT     Room Number: 364/364-A  Session:   Number of Visits to Meet Established Goals: 5    Presenting Problem: L2 fx now s/p kyphoplasty 1/25/19    History related to current admission:     Pt is 80year old fem Putting on and taking off regular upper body clothing?: A Lot  -   Taking care of personal grooming such as brushing teeth?: A Little  -   Eating meals?: A Little    AM-PAC Score:  Score: 14  Approx Degree of Impairment: 59.67%  Standardized Score (AM-PAC 5x/week      OT Goals:   OT Goals: all goals ongoing 1/31  ADL Goals  Patient will perform grooming w/ supervision, in stand, keeping spinal precautions  Patient will perform LB dressing w/ min assist and AE as needed, keeping spinal precautions  Patient w

## 2019-02-01 NOTE — PROGRESS NOTES
[facility] Pulmonary/Critical Care Progress Note     SUBJECTIVE/24H Events: All events, procedures, notes reviewed. No acute events, patient denies concerns, no cough or dyspnea. Remains on o2, weaned to 1L today.      R 55*   CA  9.2  9.3   NA  137  136   K  4.6  3.8   CL  100*  97*   CO2  32.0  33.0*     Recent Labs   Lab  02/01/19   0519   RBC  3.74*   HGB  11.5*   HCT  34.4*   MCV  92.0   MCH  30.7   MCHC  33.4   RDW  13.7   NEPRELIM  5.51   WBC  8.0   PLT  249.0     N 50 mg Oral TID   • atorvastatin  10 mg Oral Nightly   • Metoprolol Succinate ER  100 mg Oral Daily Beta Blocker   • torsemide  20 mg Oral Daily   • aspirin  81 mg Oral Daily     acetaminophen, acetaminophen **OR** HYDROcodone-acetaminophen **OR** HYDROcodo

## 2019-02-01 NOTE — PROGRESS NOTES
GREG HOSPITALIST  Progress Note     Andreasfrancis Lagunas Patient Status:  Observation    1930 MRN [id number]   [facility] 3SW-A Attending Luana Castaneda MD   [facility] Day # 6 PCP Savita Navarro DO     Chief Complaint: back pain, fall    S: Pt Succinate ER  100 mg Oral Daily Beta Blocker   • torsemide  20 mg Oral Daily   • aspirin  81 mg Oral Daily       ASSESSMENT / PLAN:     1. Recurrent falls  1. Given dementia hx is limited and pt does not recall events  2. Hypoxia  1.  Multifactorial due to

## 2019-02-01 NOTE — CM/SW NOTE
Call from Tarsha Zamorano at [facility] [phone] for update. LAURA informed her that d/c is expected for tomorrow. She notes that pt is accepted. SW following. Update sent via [address] [place] [address] Drive.

## 2019-02-01 NOTE — DISCHARGE SUMMARY
GREG HOSPITALIST  DISCHARGE SUMMARY     Jyoti Alas Patient Status:  Inpatient    1930 MRN [id number]   [facility] 3SW-A Attending Falguni Mcneil MD   [facility] Day # 7 PCP Mary Jane Atkins DO     Date of Admission: 2019  Date of CHANGE how you take these medications      Instructions Prescription details   torsemide 20 MG Tabs  Commonly known as:  DEMADEX  What changed:  when to take this      Take 1 tablet (20 mg total) by mouth 2 (two) times daily.    Quantity:  60 tablet  [facility], [phone], [address] [place] [id number]    Phone:  [phone]   · torsemide 20 MG Tabs     Please  your prescriptions at the location directed by your doctor or nurse    Bring a paper prescri

## 2019-02-01 NOTE — PHYSICAL THERAPY NOTE
PHYSICAL THERAPY TREATMENT NOTE - INPATIENT    Room Number: 364/364-A     Session: 6  Number of Visits to Meet Established Goals: 5 2 sessions added.     Presenting Problem: L2 compression fx s/p vertebroplasty    Problem List  Principal Problem:    Fall, side of the bed?: A Little   How much help from another person does the patient currently need. ..   -   Moving to and from a bed to a chair (including a wheelchair)?: A Little   -   Need to walk in hospital room?: A Little   -   Climbing 3-5 steps with a r skilled PT to improve orientation, strength, balance, endurance and mobility. The AM-PAC '6-Clicks' Inpatient Basic Mobility Short Form was completed and this patient is demonstrating a 50.57% degree of impairment in mobility.  Research supports that fernanda

## 2019-02-02 VITALS
SYSTOLIC BLOOD PRESSURE: 117 MMHG | WEIGHT: 175.25 LBS | RESPIRATION RATE: 26 BRPM | TEMPERATURE: 97 F | HEART RATE: 83 BPM | OXYGEN SATURATION: 93 % | BODY MASS INDEX: 27.51 KG/M2 | HEIGHT: 67 IN | DIASTOLIC BLOOD PRESSURE: 69 MMHG

## 2019-02-02 LAB — POTASSIUM SERPL-SCNC: 3.7 MMOL/L (ref 3.6–5.1)

## 2019-02-02 PROCEDURE — 99239 HOSP IP/OBS DSCHRG MGMT >30: CPT | Performed by: INTERNAL MEDICINE

## 2019-02-02 RX ORDER — TORSEMIDE 20 MG/1
20 TABLET ORAL 2 TIMES DAILY
Qty: 60 TABLET | Refills: 0 | Status: SHIPPED | OUTPATIENT
Start: 2019-02-02

## 2019-02-02 RX ORDER — POTASSIUM CHLORIDE 20 MEQ/1
40 TABLET, EXTENDED RELEASE ORAL ONCE
Status: COMPLETED | OUTPATIENT
Start: 2019-02-02 | End: 2019-02-02

## 2019-02-02 NOTE — PROGRESS NOTES
GREG HOSPITALIST  Progress Note     Paloma Loop Patient Status:  Observation    1930 MRN [id number]   [facility] 3SW-A Attending Pratima Nicholas MD   [facility] Day # 7 PCP Wanda Nguyen DO     Chief Complaint: back pain, fall    S: Pt 50 mg Oral TID   • atorvastatin  10 mg Oral Nightly   • Metoprolol Succinate ER  100 mg Oral Daily Beta Blocker   • torsemide  20 mg Oral Daily   • aspirin  81 mg Oral Daily       ASSESSMENT / PLAN:     1. Recurrent falls  1.  Given dementia hx is limited a

## 2019-02-02 NOTE — CM/SW NOTE
02/02/19 1700   Discharge disposition   Expected discharge disposition Skilled Nurs   Name of Facillity/Home Care/Hospice [facility]   Patient is Discharged to a [address] [place] [address] Yes   Discharge transportation Lorena Maria

## 2019-02-02 NOTE — PROGRESS NOTES
[facility] Pulmonary/Critical Care Progress Note     SUBJECTIVE/24H Events: All events, procedures, notes reviewed. No acute events per nursing, patient is confused this morning but denies concerns. No pain or dyspnea. Recent Labs   Lab  01/29/19   1251  02/01/19   0519  02/02/19   0533   GLU  107*  105*   --    BUN  35*  31*   --    CREATSERUM  1.11*  0.93   --    GFRAA  51*  63   --    GFRNAA  44*  55*   --    CA  9.2  9.3   --    NA  137  136   --    K  4.6  3.8  3. hydrALAzine HCl  50 mg Oral TID   • atorvastatin  10 mg Oral Nightly   • Metoprolol Succinate ER  100 mg Oral Daily Beta Blocker   • torsemide  20 mg Oral Daily   • aspirin  81 mg Oral Daily     acetaminophen, acetaminophen **OR** HYDROcodone-acetaminophen

## 2019-02-02 NOTE — CM/SW NOTE
Spoke with RN re: d/c plan- she expects pt will be cleared for d/c today to [facility]. Pt will require ambulance transport- requested 3PM d/c time.     [facility] ambulance [phone] accepted transport at [facility] to [address] [place] [address]   Spoke with Leigha

## 2019-02-04 ENCOUNTER — SNF VISIT (OUTPATIENT)
Dept: INTERNAL MEDICINE CLINIC | Age: 84
End: 2019-02-04

## 2019-02-04 DIAGNOSIS — S20.219D CONTUSION OF RIB, UNSPECIFIED LATERALITY, SUBSEQUENT ENCOUNTER: ICD-10-CM

## 2019-02-04 DIAGNOSIS — C43.62 MELANOMA OF FOREARM, LEFT (HCC): ICD-10-CM

## 2019-02-04 DIAGNOSIS — I48.20 CHRONIC ATRIAL FIBRILLATION (HCC): ICD-10-CM

## 2019-02-04 DIAGNOSIS — R29.6 RECURRENT FALLS: Primary | ICD-10-CM

## 2019-02-04 DIAGNOSIS — Z87.09 HISTORY OF COPD: ICD-10-CM

## 2019-02-04 DIAGNOSIS — S32.020G: ICD-10-CM

## 2019-02-04 DIAGNOSIS — Z98.890 S/P KYPHOPLASTY: ICD-10-CM

## 2019-02-04 PROCEDURE — 99309 SBSQ NF CARE MODERATE MDM 30: CPT | Performed by: NURSE PRACTITIONER

## 2019-02-05 VITALS
HEART RATE: 65 BPM | WEIGHT: 180 LBS | RESPIRATION RATE: 20 BRPM | DIASTOLIC BLOOD PRESSURE: 89 MMHG | BODY MASS INDEX: 28 KG/M2 | TEMPERATURE: 98 F | SYSTOLIC BLOOD PRESSURE: 157 MMHG | OXYGEN SATURATION: 96 %

## 2019-02-06 NOTE — PROGRESS NOTES
APN Encounter 19  3 pm    Jaimie Trevñio  : 1930  Age 80year old  female patient is admitted to [facility] for rehab s/p vertebral fracture, chronic back pain, recurrent falls,  Dementia, CHF and COPD.       [address] [place] [address] Drive date:     total) by mouth 2 (two) times daily. Disp: 60 tablet Rfl: 0   B Complex-C-Folic Acid (EQL B COMPLEX) Oral Tab Take 1 tablet by mouth daily. Disp:  Rfl:    atorvastatin 10 MG Oral Tab Take 1 tablet (10 mg total) by mouth nightly.  Disp: 30 tablet Rfl: 5   Me denies food or seasonal allergies    PHYSICAL EXAM:  GENERAL HEALTH: well developed, well nourished, in no apparent distress  except for back pain  LINES, TUBES, DRAINS:  none  SKIN:  L forearm melanoma sutures intact  EYES: PERRLA, EOMI, no jaundice, no n discuss a safe discharge plan. Therapy to discuss goals and progress weekly.     *Greater than 55 minutes spent w/ patient and family, reviewing medical records, labs, completing medication reconciliation and entering orders to establish plan of care in [place]

## 2019-02-07 ENCOUNTER — SNF VISIT (OUTPATIENT)
Dept: INTERNAL MEDICINE CLINIC | Age: 84
End: 2019-02-07

## 2019-02-07 VITALS
RESPIRATION RATE: 18 BRPM | BODY MASS INDEX: 28 KG/M2 | WEIGHT: 180.19 LBS | OXYGEN SATURATION: 96 % | DIASTOLIC BLOOD PRESSURE: 80 MMHG | SYSTOLIC BLOOD PRESSURE: 113 MMHG | TEMPERATURE: 99 F | HEART RATE: 86 BPM

## 2019-02-07 VITALS
WEIGHT: 180.19 LBS | DIASTOLIC BLOOD PRESSURE: 60 MMHG | OXYGEN SATURATION: 95 % | TEMPERATURE: 98 F | SYSTOLIC BLOOD PRESSURE: 90 MMHG | HEART RATE: 100 BPM | BODY MASS INDEX: 28 KG/M2

## 2019-02-07 DIAGNOSIS — S32.020G: ICD-10-CM

## 2019-02-07 DIAGNOSIS — R07.89 CHEST PAIN, MIDSTERNAL: ICD-10-CM

## 2019-02-07 DIAGNOSIS — C43.62 MELANOMA OF FOREARM, LEFT (HCC): ICD-10-CM

## 2019-02-07 DIAGNOSIS — Z98.890 S/P KYPHOPLASTY: ICD-10-CM

## 2019-02-07 DIAGNOSIS — I48.20 CHRONIC ATRIAL FIBRILLATION (HCC): Primary | ICD-10-CM

## 2019-02-07 DIAGNOSIS — F06.31 DEPRESSION DUE TO PHYSICAL ILLNESS: ICD-10-CM

## 2019-02-07 DIAGNOSIS — S20.219D CONTUSION OF RIB, UNSPECIFIED LATERALITY, SUBSEQUENT ENCOUNTER: ICD-10-CM

## 2019-02-07 DIAGNOSIS — Z87.09 HISTORY OF COPD: ICD-10-CM

## 2019-02-07 DIAGNOSIS — R07.89 CHEST PAIN, MIDSTERNAL: Primary | ICD-10-CM

## 2019-02-07 DIAGNOSIS — I48.20 CHRONIC ATRIAL FIBRILLATION (HCC): ICD-10-CM

## 2019-02-07 PROCEDURE — 99309 SBSQ NF CARE MODERATE MDM 30: CPT | Performed by: NURSE PRACTITIONER

## 2019-02-07 RX ORDER — TRAMADOL HYDROCHLORIDE 50 MG/1
25 TABLET ORAL EVERY 6 HOURS PRN
COMMUNITY

## 2019-02-08 NOTE — H&P
[facility]    History and Physical    Carmen Yepez Patient Status:  No patient class for patient encounter    1930 MRN [id number]   Location [facility] Attending No att. providers found   [facility] Day ALKPHO 120 01/24/2019    BILT 0.6 01/24/2019    TP 7.2 01/24/2019    AST 35 01/24/2019    ALT 26 01/24/2019    PTT 34.7 (H) 06/09/2018    INR 1.10 01/25/2019    TSH 3.400 10/10/2018    DDIMER 0.47 12/31/2017    ESRML 12 12/30/2015    MG 2.4 06/12/2018    P

## 2019-02-08 NOTE — PROGRESS NOTES
APN Encounter 19   930 am to 10 am and Family Meeting 2:00 - 2:45 pm    Mrs. Mohini Mejia was seen at bedside for yesterday's c/o of chest pain, EKG review, SOB and assessment of left forearm for suture removal, EKG review.     Family Meetin:00 - 2:45:  M 9:30.  Family Meeting 2:00 pm to 2:45 pm     Leta Bloom [facility], APN  2/7/19   2 pm   [facility]

## 2019-02-08 NOTE — PROGRESS NOTES
APN Encounter 2/6/19  2:00 pm (late entry)    RN and Speech Therapist asked this APN to assess patient in her room for c/o mild chest pain 2/10. Denies nausea, vomiting, diarrhea, constipation. Denies jaw and shoulder pain. Denies cough.   C/O mild SOB i family. Addendum:  2/6/ EKG report:  Result:  Atrial Fibrillation. chronic-not new. No new Orders    Recurrent Falls  PT/OT/ST for cognition  Wheelchair/walker/24 hour supervision     Vertebral Fx/Back Pain/Rib Contusions/Scoliosis  PT/OT.   Norco dose

## 2019-02-12 NOTE — PROGRESS NOTES
RAYSHAWN Encounter 2/12/19  11:30 am    Mrs. Lily Whittington was seen during therapy and at bedside with her son Melissa Luciano. Was asked to re-examine Mrs. Lily Whittington and review her medications and functional status with her son DMITRY who is her [facility]. POA.     ROS:  Denies chest pain, he Cleansed incision with N/S. Sutures removed in total.  Incision approximated; no drainage. Covered with dry dressing for 24 hrs then open to air. Monitor for s/s of bleeding and infection. Discharge Plan:  Family meeting 2/13/19.   Requested son Isak Echevarria t

## 2019-02-14 ENCOUNTER — SNF VISIT (OUTPATIENT)
Dept: INTERNAL MEDICINE CLINIC | Age: 84
End: 2019-02-14

## 2019-02-14 DIAGNOSIS — F03.90 ADVANCED DEMENTIA (HCC): ICD-10-CM

## 2019-02-14 DIAGNOSIS — R06.00 DYSPNEA AND RESPIRATORY ABNORMALITY: Primary | ICD-10-CM

## 2019-02-14 DIAGNOSIS — I48.91 ATRIAL FIBRILLATION, RAPID (HCC): ICD-10-CM

## 2019-02-14 DIAGNOSIS — R06.89 DYSPNEA AND RESPIRATORY ABNORMALITY: Primary | ICD-10-CM

## 2019-02-14 PROCEDURE — 99310 SBSQ NF CARE HIGH MDM 45: CPT | Performed by: NURSE PRACTITIONER

## 2019-02-17 VITALS
SYSTOLIC BLOOD PRESSURE: 130 MMHG | DIASTOLIC BLOOD PRESSURE: 78 MMHG | OXYGEN SATURATION: 84 % | WEIGHT: 182.19 LBS | BODY MASS INDEX: 29 KG/M2 | TEMPERATURE: 97 F | RESPIRATION RATE: 18 BRPM | HEART RATE: 92 BPM

## 2019-02-17 RX ORDER — LEVOFLOXACIN 500 MG/1
500 TABLET, FILM COATED ORAL DAILY
COMMUNITY

## 2019-02-17 NOTE — PROGRESS NOTES
APN Encounter 2/141/19 5 pm (late entry)    RN requested this APN to assess resident's SOB, cough, low pulse ox, and lethargy. Met with resident at bedside; difficult to respond to questions. Able to nod her head and garble few words. Denied pain.   Re Probiotics, Fish Oil, Allopurinol, MVI, Atorvastatin. [facility] meet with son Cornelius Sy.     Plan:  [facility] to follow  Dr. Enrique Kay notified of transition to [facility]) [place]

## 2019-02-28 VITALS
DIASTOLIC BLOOD PRESSURE: 68 MMHG | HEART RATE: 72 BPM | WEIGHT: 184 LBS | SYSTOLIC BLOOD PRESSURE: 124 MMHG | HEIGHT: 63 IN | BODY MASS INDEX: 32.6 KG/M2

## 2019-02-28 VITALS
DIASTOLIC BLOOD PRESSURE: 76 MMHG | BODY MASS INDEX: 32.43 KG/M2 | HEIGHT: 63 IN | WEIGHT: 183 LBS | SYSTOLIC BLOOD PRESSURE: 136 MMHG | HEART RATE: 80 BPM

## 2019-02-28 VITALS
HEART RATE: 64 BPM | HEIGHT: 63 IN | SYSTOLIC BLOOD PRESSURE: 120 MMHG | DIASTOLIC BLOOD PRESSURE: 72 MMHG | BODY MASS INDEX: 31.71 KG/M2 | WEIGHT: 179 LBS

## 2019-02-28 VITALS
HEART RATE: 78 BPM | SYSTOLIC BLOOD PRESSURE: 128 MMHG | HEIGHT: 63 IN | DIASTOLIC BLOOD PRESSURE: 88 MMHG | BODY MASS INDEX: 32.25 KG/M2 | WEIGHT: 182 LBS

## 2019-02-28 VITALS
WEIGHT: 184 LBS | BODY MASS INDEX: 32.6 KG/M2 | SYSTOLIC BLOOD PRESSURE: 142 MMHG | DIASTOLIC BLOOD PRESSURE: 76 MMHG | HEART RATE: 63 BPM | HEIGHT: 63 IN

## 2022-11-01 NOTE — CDS QUERY
Heart Failure  CLINICAL Juan Alberto Montoya  Dear Doctor:  Clinical information (provided below) includes a diagnosis of Diastolic Heart Failure, as well as Fluid Overload.  For accurate ICD-10-CM code assignment to reflect severity of illnes 1/28, then Lasix 40 mg IV on 1/30, 1/31 & 2/1. For questions regarding this query, please contact Clinical :   Wesley Hudson RN CCDS x [phone]                          Thank you! Abdomen soft, non-tender and non-distended, no rebound, no guarding and no masses. no hepatosplenomegaly.

## 2023-09-18 NOTE — ED PROVIDER NOTES
Patient Seen in: [facility] Emergency Department    History   Patient presents with:  Dyspnea EVAN SOB (respiratory)    Stated Complaint: evan, sweating    HPI    Additional history is obtained from patient's son.   Patient's son notes that patient has are as noted in HPI. Constitutional and vital signs reviewed. All other systems reviewed and negative except as noted above.     Physical Exam   ED Triage Vitals [12/31/17 1806]  BP: (!) 161/111  Pulse: 103  Resp: 26  Temp: (!) 97 °F (36.1 °C)  Temp s Equivalent Units.     D-Dimer results of less than 0.5 ug/mL (FEU) have been shown to contribute to the exclusion of venous thromboembolism with a negative predictive value of approximately 95% when results are used as part of the total clinical evaluation less likely. DVT to account for swelling of her legs must also be excluded    Patient was closely monitored. She was treated with Lasix and started on nitroglycerin. CBC shows normal white count, hemoglobin, and platelets.   Metabolic panel shows creat Frost (0,1+,2+,3+,4+): 2+

## (undated) NOTE — IP AVS SNAPSHOT
Patient Demographics     Address  [address] [place] [address] Phone  [phone] [facility]) E-mail Address  [email or web address]      Emergency Contact(s)     Name Relation Home Work Leonel Chino [phone]      Allergies potassium chloride 40 meq/30 ml (10%) Soln  Commonly known as:  K-SOL      Take 20 mEq by mouth daily. PRO-BIOTIC BLEND Caps      Take 1 capsule by mouth daily.           Senna 8.6 MG Tabs  Commonly known as:  SENOKOT      Take 8.6 mg by mo [id number] docusate sodium (COLACE) cap 100 mg 02/02/19 0856 Given      [id number] furosemide (LASIX) injection 40 mg 02/01/19 1743 Given      [id number] furosemide (LASIX) injection 40 mg 02/02/19 0856 Given      [id number] hydrALAzine HCl (APRESOLINE) tab 5 Author:  Abida Valdes MD Service:  Ulices Gonzalez Author Type:  Physician    Filed:  1/24/2019 10:48 PM Date of Service:  1/24/2019  8:49 PM Status:  Signed    :  Abida Valdes MD (Physician)         [facility]  History and Physical     John Paul Quinn Disp:  Rfl:    atorvastatin 10 MG Oral Tab Take 1 tablet (10 mg total) by mouth nightly. Disp: 30 tablet Rfl: 5   Metoprolol Succinate  MG Oral Tablet 24 Hr Take 1 tablet (100 mg total) by mouth Daily Beta Blocker.  Disp: 30 tablet Rfl: 5   furosemide HEENT: Normocephalic atraumatic. Moist mucous membranes. EOM-I. PERRLA. Anicteric. Neck: No lymphadenopathy. No JVD. No carotid bruits. Respiratory: Clear to auscultation bilaterally. No wheezes. No rhonchi. Cardiovascular: S1, S2. [FA.1] IR IR[FA. 2] No run.  ? Benefit of watchman. Pt to see her cardiologist next week  3. Hold elliquis for now for possible procedure  10. Dementia[FA.2]      Quality:  · DVT Prophylaxis:[FA.1] eliquis[FA. 2]  · CODE status:[FA.1] full[FA. 2]  · Grigsby:[FA.1] no[FA. 2]    Plan o complaints and declines to answer any further questions as she states she is only concerned about her back pain and asking for the location of her son Lilia Dick.[EK.2]     History:  Past Medical History:   Diagnosis Date   • A-fib [facility])    • Arrhythmia 01/30/19 0353 137/87 97.8 °F (36.6 °C) Oral 93 20 95 % —   01/30/19 0135 — — — 88 20 92 % —   01/30/19 0000 131/62 98.1 °F (36.7 °C) Oral 89 24 95 % —   01/29/19 1946 — 97.2 °F (36.2 °C) Oral 82 18 90 % —   01/29/19 1930 — — — — — — 169 lb 15.6 oz (77.1 kg MCHC  33.1   RDW  13.6   NEPRELIM  9.49*   WBC  12.2   PLT  213.0     No results for input(s): BNP in the last 168 hours.   Recent Labs   Lab  01/24/19   1932   TROP  <0.046     Recent Labs   Lab  01/25/19   0459   INR  1.10       Other Labs:     ABG:     R · Falls with L2 vertebral body fracture s/p kyphoplasty  · Diastolic heart failure, CAD, afib, HTN  · dementia  · H/o tobacco abuse - unclear quantaitive hx, no known h/o pulmonary disease[EK. 2]    PLAN[EK.1]    · Repeat chest xray today  · Add proBNP leve Sonographer: Vernon Maguire Ordering:    Jud Vivas  Referring:   Jud Vivas ---------------------------------------------------------------------------- History/Indications:   Syncope. ------------------------------------------------------------------ ---------------------------------------------------------------------------- * Left ventricle: The cavity size was normal. Wall thickness was normal. Systolic function was normal. The estimated ejection fraction was 60-65%.  Ventricular septum:   The conto (H)     1.0   cm     0.6 - 0.9  IVS/LV PW ratio, ED                    0.82         ---------  LV ejection fraction                   55    %      >=55   Ventricular septum                     Value        Reference  IVS thickness, ED - Prepared and electronically signed by Cornelius Falcon M.D. 01/26/2019 18:02         Cath Angiogram Results (HF Patients only)    No exam resulted this encounter.           Physical Therapy Notes (last 72 hours) (Notes from 1/30/2019  2:56 PM through 2/2/ BALANCE                                                                                                                     Static Sitting: Fair  Dynamic Sitting: Fair           Static Standing: Poor +  Dynamic Standing: Poor +    ACTIVITY TOLERANCE slight flexed, cues for proper steps and posture. Pt performed lynn le's AP, heel slides, hip abd with assist  X 15 reps. Change brief prior to getting patient out of bed, pt loses bladder control during standing, change of brief is necessary after amb. Physical Therapy Note signed by Vance Bundy PTA at 1/31/2019  2:44 PM  Version 1 of 1    Author:  Vance Bundy PTA Service:  Rehab Author Type:  Physical Therapy Assistant    Filed:  1/31/2019  2:44 PM Date of Service:  1/31/2019  2:33 PM St ACTIVITY TOLERANCE        4 L during activity. SPO2 92% to 95%      AM-PAC '6-Clicks' INPATIENT SHORT FORM - BASIC MOBILITY  How much difficulty does the patient currently have. ..[BR.1]  -   Turning over in bed (including adjusting bedclothes, sheets and b Pt was admitted s/p fall diagnosed with L2 compression fracture followed by vertebroplasty. Recently complicated by R effusion, and edema. Pt remains lethargic and maintains eyes closed unless cues provided to open eyes.  Pt's progress fluctuates day to day 1:16 PM  Version 1 of 1    Author:  Calixto Aviles OT Service:  Rehab Author Type:  Occupational Therapist    Filed:  2/1/2019  1:16 PM Date of Service:  1/31/2019 12:03 PM Status:  Signed    :  Calixto Aviles OT (Occupational Therapist) AM-PAC ‘6-Clicks’ Inpatient Daily Activity Short Form  How much help from another person does the patient currently need…  -   Putting on and taking off regular lower body clothing?: A Lot  -   Bathing (including washing, rinsing, drying)?: A Lot  -   Toil OT Discharge Recommendations: Sub-acute rehabilitation  OT Device Recommendations: None  PLAN  OT Treatment Plan: Balance activities; Energy conservation/work simplification techniques;ADL training;Functional transfer training;Patient/Family education;Equip

## (undated) NOTE — IP AVS SNAPSHOT
[address]            (For Outpatient Use Only) Initial Admit Date: 1/24/2019   Inpt/Obs Admit Date: Inpt: 1/26/19 / Obs: 01/24/19   Discharge Date:    Hospital Acct:  [de-identified]   MRN: [de-identified]   CSN: [id number]        [id number]  EV Subscriber ID:  Pt Rel to Subscriber:    Hospital Account Financial Class: Medicare    February 2, 2019

## (undated) NOTE — ED AVS SNAPSHOT
Mandeep Soria   MRN: [id number]    Department:  [facility] Emergency Department   Date of Visit:  4/1/2018           Disclosure     Insurance plans vary and the physician(s) referred by the ER may not be covered by your plan.  Please contact your in tell this physician (or your personal doctor if your instructions are to return to your personal doctor) about any new or lasting problems. The primary care or specialist physician will see patients referred from the [facility] Emergency Department.  Magda Kaplan

## (undated) NOTE — LETTER
Consent to Procedure/Sedation    Date: 1/25/2019    Time: _______________    1. I authorize the performance upon Jim Ramirez the following:    Kyphoplasty    2.  I authorize Dr. Justin Mccarthy whomever is designated as the doctor’s assistant), to perform th Witness: ____________________     Date: ______________    Printed: 2019   1:11 PM    Patient Name: Raciel Sotomayor        : 1930       Medical Record #: [id number]